# Patient Record
Sex: FEMALE | Race: WHITE | Employment: FULL TIME | URBAN - METROPOLITAN AREA
[De-identification: names, ages, dates, MRNs, and addresses within clinical notes are randomized per-mention and may not be internally consistent; named-entity substitution may affect disease eponyms.]

---

## 2017-02-01 ENCOUNTER — TELEPHONE (OUTPATIENT)
Dept: FAMILY MEDICINE | Facility: CLINIC | Age: 47
End: 2017-02-01

## 2017-02-01 NOTE — TELEPHONE ENCOUNTER
Panel Management Review      Patient has the following on her problem list: None      Composite cancer screening  Chart review shows that this patient is due/due soon for the following Pap Smear  Summary:    Patient is due/failing the following:   PAP and PHYSICAL    Action needed:   Patient needs office visit for physical and pap.    Type of outreach:    Phone, left message for patient to call back.     Questions for provider review:    None                                                                                                                                    Eve Barton CMA       Chart routed to self.

## 2017-02-01 NOTE — LETTER
[facility]  [address] [place]  [address]  Phone: [phone]      February 16, 2017      Josette Foster  [address]  [address]  [facility] [address]            Dear Ms. Foster,    As part of [facility]'s commitment to health and wellness we have recently reviewed your chart and your medical record indicates that you are due for one or more of the following:    -- Pap smear. The last pap that we have on file for you was from 11/13/2013. These are recommended every 3 years. Please call our clinic to schedule your pap smear / physical appointment.     Please try to schedule and/or complete the tests above within the next 2-4 weeks.   The number to call to schedule an appointment at [facility] [phone].    While we work hard to maintain accurate records on all our patients, it is always possible that this notice does not accurately reflect tests that you may have had. To ensure that we do not continue to send you notices please verify, at your next visit or by a C-Vibest message, that we have accurate dates of your tests, even if these were done many years ago.     Working together for your health is our goal.    Thank you for choosing [facility] for your healthcare needs.      Sincerely,     Velma RODRIGUEZ/  [facility] Care Team

## 2017-02-06 DIAGNOSIS — E03.9 HYPOTHYROIDISM, UNSPECIFIED TYPE: Primary | ICD-10-CM

## 2017-02-06 RX ORDER — LEVOTHYROXINE SODIUM 75 UG/1
75 TABLET ORAL DAILY
Qty: 90 TABLET | Refills: 1 | Status: SHIPPED | OUTPATIENT
Start: 2017-02-06 | End: 2017-03-22

## 2017-02-06 NOTE — TELEPHONE ENCOUNTER
levothyroxine (SYNTHROID, LEVOTHROID) 75 MCG tablet     Last Written Prescription Date: 7/13/16  Last Quantity: 90, # refills: 1  Last Office Visit with [facility], [facility] or [facility] prescribing provider: 9/29/16        TSH   Date Value Ref Range Status   07/12/2016 1.67 0.40 - 4.00 mU/L Final

## 2017-03-22 ENCOUNTER — OFFICE VISIT (OUTPATIENT)
Dept: FAMILY MEDICINE | Facility: CLINIC | Age: 47
End: 2017-03-22
Payer: COMMERCIAL

## 2017-03-22 VITALS
BODY MASS INDEX: 38.98 KG/M2 | HEIGHT: 68 IN | HEART RATE: 77 BPM | SYSTOLIC BLOOD PRESSURE: 127 MMHG | TEMPERATURE: 98.7 F | DIASTOLIC BLOOD PRESSURE: 81 MMHG | WEIGHT: 257.2 LBS

## 2017-03-22 DIAGNOSIS — J06.9 VIRAL URI: ICD-10-CM

## 2017-03-22 DIAGNOSIS — Z12.4 SCREENING FOR MALIGNANT NEOPLASM OF CERVIX: ICD-10-CM

## 2017-03-22 DIAGNOSIS — G43.109 MIGRAINE WITH AURA AND WITHOUT STATUS MIGRAINOSUS, NOT INTRACTABLE: ICD-10-CM

## 2017-03-22 DIAGNOSIS — Z12.31 VISIT FOR SCREENING MAMMOGRAM: ICD-10-CM

## 2017-03-22 DIAGNOSIS — E78.5 HYPERLIPIDEMIA LDL GOAL <160: ICD-10-CM

## 2017-03-22 DIAGNOSIS — E03.9 HYPOTHYROIDISM, UNSPECIFIED TYPE: ICD-10-CM

## 2017-03-22 DIAGNOSIS — K21.00 GASTROESOPHAGEAL REFLUX DISEASE WITH ESOPHAGITIS: ICD-10-CM

## 2017-03-22 DIAGNOSIS — Z00.01 ENCOUNTER FOR ROUTINE ADULT HEALTH EXAMINATION WITH ABNORMAL FINDINGS: Primary | ICD-10-CM

## 2017-03-22 LAB
ALBUMIN SERPL-MCNC: 3.6 G/DL (ref 3.4–5)
ALP SERPL-CCNC: 85 U/L (ref 40–150)
ALT SERPL W P-5'-P-CCNC: 16 U/L (ref 0–50)
ANION GAP SERPL CALCULATED.3IONS-SCNC: 6 MMOL/L (ref 3–14)
AST SERPL W P-5'-P-CCNC: 11 U/L (ref 0–45)
BASOPHILS # BLD AUTO: 0 10E9/L (ref 0–0.2)
BASOPHILS NFR BLD AUTO: 0.6 %
BILIRUB SERPL-MCNC: 0.3 MG/DL (ref 0.2–1.3)
BUN SERPL-MCNC: 17 MG/DL (ref 7–30)
CALCIUM SERPL-MCNC: 8.8 MG/DL (ref 8.5–10.1)
CHLORIDE SERPL-SCNC: 106 MMOL/L (ref 94–109)
CHOLEST SERPL-MCNC: 256 MG/DL
CO2 SERPL-SCNC: 28 MMOL/L (ref 20–32)
CREAT SERPL-MCNC: 0.82 MG/DL (ref 0.52–1.04)
DIFFERENTIAL METHOD BLD: ABNORMAL
EOSINOPHIL # BLD AUTO: 0.3 10E9/L (ref 0–0.7)
EOSINOPHIL NFR BLD AUTO: 4.9 %
ERYTHROCYTE [DISTWIDTH] IN BLOOD BY AUTOMATED COUNT: 14.9 % (ref 10–15)
GFR SERPL CREATININE-BSD FRML MDRD: 75 ML/MIN/1.7M2
GLUCOSE SERPL-MCNC: 90 MG/DL (ref 70–99)
HCT VFR BLD AUTO: 39.8 % (ref 35–47)
HDLC SERPL-MCNC: 47 MG/DL
HGB BLD-MCNC: 12.8 G/DL (ref 11.7–15.7)
LDLC SERPL CALC-MCNC: 180 MG/DL
LYMPHOCYTES # BLD AUTO: 2.5 10E9/L (ref 0.8–5.3)
LYMPHOCYTES NFR BLD AUTO: 36.6 %
MCH RBC QN AUTO: 27.5 PG (ref 26.5–33)
MCHC RBC AUTO-ENTMCNC: 32.2 G/DL (ref 31.5–36.5)
MCV RBC AUTO: 86 FL (ref 78–100)
MONOCYTES # BLD AUTO: 0.7 10E9/L (ref 0–1.3)
MONOCYTES NFR BLD AUTO: 9.7 %
NEUTROPHILS # BLD AUTO: 3.3 10E9/L (ref 1.6–8.3)
NEUTROPHILS NFR BLD AUTO: 48.2 %
NONHDLC SERPL-MCNC: 209 MG/DL
PLATELET # BLD AUTO: 536 10E9/L (ref 150–450)
POTASSIUM SERPL-SCNC: 4 MMOL/L (ref 3.4–5.3)
PROT SERPL-MCNC: 7.7 G/DL (ref 6.8–8.8)
RBC # BLD AUTO: 4.65 10E12/L (ref 3.8–5.2)
SODIUM SERPL-SCNC: 140 MMOL/L (ref 133–144)
TRIGL SERPL-MCNC: 147 MG/DL
TSH SERPL DL<=0.005 MIU/L-ACNC: 2.92 MU/L (ref 0.4–4)
WBC # BLD AUTO: 6.7 10E9/L (ref 4–11)

## 2017-03-22 PROCEDURE — G0145 SCR C/V CYTO,THINLAYER,RESCR: HCPCS | Performed by: PHYSICIAN ASSISTANT

## 2017-03-22 PROCEDURE — 87624 HPV HI-RISK TYP POOLED RSLT: CPT | Performed by: PHYSICIAN ASSISTANT

## 2017-03-22 PROCEDURE — 80061 LIPID PANEL: CPT | Performed by: PHYSICIAN ASSISTANT

## 2017-03-22 PROCEDURE — 80053 COMPREHEN METABOLIC PANEL: CPT | Performed by: PHYSICIAN ASSISTANT

## 2017-03-22 PROCEDURE — 84443 ASSAY THYROID STIM HORMONE: CPT | Performed by: PHYSICIAN ASSISTANT

## 2017-03-22 PROCEDURE — 99396 PREV VISIT EST AGE 40-64: CPT | Performed by: PHYSICIAN ASSISTANT

## 2017-03-22 PROCEDURE — 99213 OFFICE O/P EST LOW 20 MIN: CPT | Mod: 25 | Performed by: PHYSICIAN ASSISTANT

## 2017-03-22 PROCEDURE — 85025 COMPLETE CBC W/AUTO DIFF WBC: CPT | Performed by: PHYSICIAN ASSISTANT

## 2017-03-22 PROCEDURE — 36415 COLL VENOUS BLD VENIPUNCTURE: CPT | Performed by: PHYSICIAN ASSISTANT

## 2017-03-22 RX ORDER — PROPRANOLOL HYDROCHLORIDE 20 MG/1
20 TABLET ORAL 2 TIMES DAILY
Qty: 60 TABLET | Refills: 1 | Status: SHIPPED | OUTPATIENT
Start: 2017-03-22

## 2017-03-22 RX ORDER — LEVOTHYROXINE SODIUM 75 UG/1
75 TABLET ORAL DAILY
Qty: 90 TABLET | Refills: 1 | Status: SHIPPED | OUTPATIENT
Start: 2017-03-22 | End: 2017-08-14

## 2017-03-22 NOTE — PATIENT INSTRUCTIONS
MRI/MRA scan - For [place] imaging department: call [phone] to schedule  Follow up if symptoms worsen or not improving  If normal, consider triptan medication for migraine    Preventive Health Recommendations  Female Ages 40 to 49    Yearly exam:     See your health care provider every year in order to  1. Review health changes.   2. Discuss preventive care.    3. Review your medicines if your doctor prescribed any.      Get a Pap test every three years (unless you have an abnormal result and your provider advises testing more often).      If you get Pap tests with HPV test, you only need to test every 5 years, unless you have an abnormal result. You do not need a Pap test if your uterus was removed (hysterectomy) and you have not had cancer.      You should be tested each year for STDs (sexually transmitted diseases), if you're at risk.       Ask your doctor if you should have a mammogram.      Have a colonoscopy (test for colon cancer) if someone in your family has had colon cancer or polyps before age 50.       Have a cholesterol test every 5 years.       Have a diabetes test (fasting glucose) after age 45. If you are at risk for diabetes, you should have this test every 3 years.    Shots: Get a flu shot each year. Get a tetanus shot every 10 years.     Nutrition:     Eat at least 5 servings of fruits and vegetables each day.    Eat whole-grain bread, whole-wheat pasta and brown rice instead of white grains and rice.    Talk to your provider about Calcium and Vitamin D.     Lifestyle    Exercise at least 150 minutes a week (an average of 30 minutes a day, 5 days a week). This will help you control your weight and prevent disease.    Limit alcohol to one drink per day.    No smoking.     Wear sunscreen to prevent skin cancer.    See your dentist every six months for an exam and cleaning.

## 2017-03-22 NOTE — PROGRESS NOTES
"   SUBJECTIVE:     CC: Josette Foster is an 46 year old woman who presents for preventive health visit.     Healthy Habits:    Do you get at least three servings of calcium containing foods daily (dairy, green leafy vegetables, etc.)? yes    Amount of exercise or daily activities, outside of work: None recently she was going to school and working full time.  Will start again when weather gets nice    Problems taking medications regularly No    Medication side effects: No    Have you had an eye exam in the past two years? no    Do you see a dentist twice per year? yes    Do you have sleep apnea, excessive snoring or daytime drowsiness?no    Patient informed that anything we discuss that is not related to preventative medicine, may be billed for; patient verbalizes understanding.     *  Recheck Migraines, has been having them more frequently.  Migraines have increased since July 2016, states she has been having more ocular and auras with she has never had in the past. Takes aleve, propranolol which seems to help but would like to discuss going back on Relafen.    Not taking propranolol daily anymore  Auras are new for her - she had this during school this fall  Headache: Starts back of head, up to top of head, into eye area  Does hold tension in neck  Full blown migraines: has always had \"numbness\" fsensation left side of face, dizzy, sometimes nausea no vomiting.   What is new: Colorful flashing lights both sides, sometimes black spots. No other vision changes/blurred vision  Usually last a couple days  Lately has been getting them monthly. Prior to this summer every 3-4 months  She will take aleve or nabumetone, mountain dew, sleeps  Was on birth control in the past, didn't help headaches  Had MRI 2008  Has had migraines a long time. Tends to happen around periods. Lots of stress with school/job    Masters in mental health counseling - just finished last week.     Has a Cold right now  No fevers  No CP or SOB "   Sometimes hot in the shower, would like to recheck thyroid    Heartburn controlled if she watches what she eats. Takes unknown OTC at night  It is better than this fall   She does not want scope, cannot afford at this point with medical bills      Today's PHQ-2 Score:   PHQ-2 ( 1999 Pfizer) 3/22/2017 4/6/2015   Q1: Little interest or pleasure in doing things 0 0   Q2: Feeling down, depressed or hopeless 0 0   PHQ-2 Score 0 0       Abuse: Current or Past(Physical, Sexual or Emotional)- No  Do you feel safe in your environment - Yes    Social History   Substance Use Topics     Smoking status: Never Smoker     Smokeless tobacco: Never Used      Comment: no smokers in household     Alcohol use Yes      Comment: occ     The patient does not drink >3 drinks per day nor >7 drinks per week.    Recent Labs   Lab Test  04/16/15   1043  02/01/11   0910   CHOL  230*  204*   HDL  61  53   LDL  135*  132*   TRIG  169*  97   CHOLHDLRATIO  3.8  4.0       Reviewed orders with patient.  Reviewed health maintenance and updated orders accordingly - Yes    Mammo Decision Support:  Patient under age 50, mutual decision reflected in health maintenance.  Grandmother had breast cancer age 80    Pertinent mammograms are reviewed under the imaging tab.  History of abnormal Pap smear: NO - age 30- 65 PAP every 3 years recommended    Regular periods    Reviewed and updated as needed this visit by clinical staff  Tobacco  Allergies  Meds  Med Hx  Surg Hx  Fam Hx  Soc Hx        Reviewed and updated as needed this visit by Provider  Tobacco  Allergies  Meds  Med Hx  Surg Hx  Fam Hx  Soc Hx       Past Medical History:   Diagnosis Date     Migraine, unspecified, without mention of intractable migraine without mention of status migrainosus 3/18/2008    on Propranolol 20 mg bid     Mixed hyperlipidemia     On Tricor.     Unspecified hypothyroidism 8/12/2005    stable on 50 mcg of synthroid.      Past Surgical History:   Procedure  Laterality Date     C ORAL SURGERY PROCEDURE  1979    wisdom     C REMOVAL GALLBLADDER  2003     ESOPHAGOSCOPY, GASTROSCOPY, DUODENOSCOPY (EGD), COMBINED N/A 12/15/2015    Procedure: COMBINED ESOPHAGOSCOPY, GASTROSCOPY, DUODENOSCOPY (EGD), BIOPSY SINGLE OR MULTIPLE;  Surgeon: Yamilka Chapin MD;  Location: [facility]       ROS:  C: NEGATIVE for fever, chills, change in weight  I: NEGATIVE for worrisome rashes, moles or lesions  E: NEGATIVE for vision changes or irritation POSITIVE migraines with aura  ENT: NEGATIVE for ear, mouth and throat problems POSITIVE cold  R: NEGATIVE for significant cough or SOB  B: NEGATIVE for masses, tenderness or discharge  CV: NEGATIVE for chest pain, palpitations or peripheral edema  GI: NEGATIVE for nausea, abdominal pain, heartburn, or change in bowel habits  : NEGATIVE for unusual urinary or vaginal symptoms. Periods are regular.  M: NEGATIVE for significant arthralgias or myalgia  N: NEGATIVE for weakness, dizziness or paresthesias  P: NEGATIVE for changes in mood or affect    Problem list, Medication list, Allergies, and Medical/Social/Surgical histories reviewed in [facility] and updated as appropriate.  Labs reviewed in EPIC  BP Readings from Last 3 Encounters:   03/22/17 127/81   09/29/16 120/81   12/24/15 127/79    Wt Readings from Last 3 Encounters:   03/22/17 257 lb 3.2 oz (116.7 kg)   09/29/16 254 lb 14.4 oz (115.6 kg)   12/24/15 256 lb 14.4 oz (116.5 kg)                  Patient Active Problem List   Diagnosis     Obesity     Hypothyroidism     Migraine     Infertility female     HYPERLIPIDEMIA LDL GOAL <160     Esophagitis     Gastroesophageal reflux disease with esophagitis     Past Surgical History:   Procedure Laterality Date     C ORAL SURGERY PROCEDURE  1979    wisdom     C REMOVAL GALLBLADDER  2003     ESOPHAGOSCOPY, GASTROSCOPY, DUODENOSCOPY (EGD), COMBINED N/A 12/15/2015    Procedure: COMBINED ESOPHAGOSCOPY, GASTROSCOPY, DUODENOSCOPY (EGD), BIOPSY SINGLE OR  "MULTIPLE;  Surgeon: Yamilka Chapin MD;  Location: [facility]       Social History   Substance Use Topics     Smoking status: Never Smoker     Smokeless tobacco: Never Used      Comment: no smokers in household     Alcohol use Yes      Comment: occ     Family History   Problem Relation Age of Onset     HEART DISEASE Mother      murmur     GASTROINTESTINAL DISEASE Mother      gallbladder removal     DIABETES Mother      Migraines Mother      Respiratory Father      emphysema     Thyroid Disease Father      Peptic Ulcer Disease Father      CANCER Maternal Grandmother      breast age 80     HEART DISEASE Maternal Grandfather      hypertension     HEART DISEASE Brother      MI at 35, smoker     Depression Brother      HEART DISEASE Paternal Grandfather      MI     GASTROINTESTINAL DISEASE Sister      gallbladder removal     Depression Sister      Migraines Sister      Migraines Nephew      Mom Kadie         OBJECTIVE:     /81 (BP Location: Right arm, Patient Position: Chair, Cuff Size: Adult Regular)  Pulse 77  Temp 98.7  F (37.1  C) (Tympanic)  Ht 5' 8.11\" (1.73 m)  Wt 257 lb 3.2 oz (116.7 kg)  LMP 03/12/2017  BMI 38.98 kg/m2  EXAM:  GENERAL: healthy, alert and no distress  EYES: Eyes grossly normal to inspection, PERRL and conjunctivae and sclerae normal  HENT: ear canals and TM's normal, nose and mouth without ulcers or lesions  NECK: no adenopathy, no asymmetry, masses, or scars and thyroid normal to palpation  RESP: lungs clear to auscultation - no rales, rhonchi or wheezes  BREAST: normal without masses, tenderness or nipple discharge and no palpable axillary masses or adenopathy  CV: regular rate and rhythm, normal S1 S2, no S3 or S4, no murmur, click or rub, no peripheral edema and peripheral pulses strong  ABDOMEN: soft, nontender, no hepatosplenomegaly, no masses and bowel sounds normal   (female): normal female external genitalia, normal urethral meatus, vaginal mucosa, normal " cervix/adnexa/uterus without masses or discharge  MS: no gross musculoskeletal defects noted, no edema  SKIN: no suspicious lesions or rashes  BACK: no CVA tenderness, no paralumbar tenderness  PSYCH: mentation appears normal, affect normal/bright  LYMPH: no cervical, supraclavicular, axillary, or inguinal adenopathy  NEURO: Normal strength and tone, sensory exam grossly normal, mentation intact and speech normal. Reflexes equal and symmetric.  CN 2-12 grossly intact. Romberg negative. Heel/toe walk normal. Alternating movements, heel to shin normal, proprioception normal. PERRLA, EOMs intact.     ASSESSMENT/PLAN:         ICD-10-CM    1. Encounter for routine adult health examination with abnormal findings Z00.01    2. Migraine with aura and without status migrainosus, not intractable G43.109 propranolol (INDERAL) 20 MG tablet     Comprehensive metabolic panel     CBC with platelets differential     MR Brain w/o & w Contrast     MRA Angiogram Brain and Neck   3. Hypothyroidism, unspecified type E03.9 levothyroxine (SYNTHROID/LEVOTHROID) 75 MCG tablet     TSH with free T4 reflex   4. Gastroesophageal reflux disease with esophagitis K21.0    5. Visit for screening mammogram Z12.31 MA Screening Digital Bilateral     HPV High Risk Types DNA Cervical   6. Hyperlipidemia LDL goal <160 E78.5 Lipid Profile with reflex to direct LDL   7. Screening for malignant neoplasm of cervix Z12.4 Pap imaged thin layer screen with HPV - recommended age 30 - 65 years (select HPV order below)     Longtime history of migraines, however aura is new the last few months. Red flag signs to be seen urgently were discussed.    MRI/MRA scan - For [place] imaging department: call [phone] to schedule  Follow up if symptoms worsen or not improving  If normal, consider triptan medication for migraine    COUNSELING:   Reviewed preventive health counseling, as reflected in patient instructions     reports that she has never smoked. She has never  "used smokeless tobacco.    Estimated body mass index is 38.98 kg/(m^2) as calculated from the following:    Height as of this encounter: 5' 8.11\" (1.73 m).    Weight as of this encounter: 257 lb 3.2 oz (116.7 kg).   Weight management plan: Discussed healthy diet and exercise guidelines and patient will follow up in 12 months in clinic to re-evaluate.    Counseling Resources:  ATP IV Guidelines  Pooled Cohorts Equation Calculator  Breast Cancer Risk Calculator  FRAX Risk Assessment  ICSI Preventive Guidelines  Dietary Guidelines for Americans, 2010  USDA's MyPlate  ASA Prophylaxis  Lung CA Screening    Alexandra Arnold PA-C  [facility]  "

## 2017-03-22 NOTE — NURSING NOTE
"Chief Complaint   Patient presents with     Physical       Initial /81 (BP Location: Right arm, Patient Position: Chair, Cuff Size: Adult Regular)  Pulse 77  Temp 98.7  F (37.1  C) (Tympanic)  Ht 5' 8.11\" (1.73 m)  Wt 257 lb 3.2 oz (116.7 kg)  LMP 03/12/2017  BMI 38.98 kg/m2 Estimated body mass index is 38.98 kg/(m^2) as calculated from the following:    Height as of this encounter: 5' 8.11\" (1.73 m).    Weight as of this encounter: 257 lb 3.2 oz (116.7 kg).  Medication Reconciliation: lencho Kasper CMA  "

## 2017-03-22 NOTE — MR AVS SNAPSHOT
After Visit Summary   3/22/2017    Josette Foster    MRN: [id number]           Patient Information     Date Of Birth          1970        Visit Information        Provider Department      3/22/2017 10:00 AM Alexandra Arnold PA-C [facility]        Today's Diagnoses     Visit for screening mammogram    -  1    Hypothyroidism, unspecified type        Other type of migraine        Hyperlipidemia LDL goal <160        Migraine with aura and without status migrainosus, not intractable          Care Instructions    MRI/MRA scan - For [place] imaging department: call [phone] to schedule  Follow up if symptoms worsen or not improving  If normal, consider triptan medication for migraine    Preventive Health Recommendations  Female Ages 40 to 49    Yearly exam:     See your health care provider every year in order to  1. Review health changes.   2. Discuss preventive care.    3. Review your medicines if your doctor prescribed any.      Get a Pap test every three years (unless you have an abnormal result and your provider advises testing more often).      If you get Pap tests with HPV test, you only need to test every 5 years, unless you have an abnormal result. You do not need a Pap test if your uterus was removed (hysterectomy) and you have not had cancer.      You should be tested each year for STDs (sexually transmitted diseases), if you're at risk.       Ask your doctor if you should have a mammogram.      Have a colonoscopy (test for colon cancer) if someone in your family has had colon cancer or polyps before age 50.       Have a cholesterol test every 5 years.       Have a diabetes test (fasting glucose) after age 45. If you are at risk for diabetes, you should have this test every 3 years.    Shots: Get a flu shot each year. Get a tetanus shot every 10 years.     Nutrition:     Eat at least 5 servings of fruits and vegetables each day.    Eat whole-grain bread, whole-wheat pasta and  brown rice instead of white grains and rice.    Talk to your provider about Calcium and Vitamin D.     Lifestyle    Exercise at least 150 minutes a week (an average of 30 minutes a day, 5 days a week). This will help you control your weight and prevent disease.    Limit alcohol to one drink per day.    No smoking.     Wear sunscreen to prevent skin cancer.    See your dentist every six months for an exam and cleaning.        Follow-ups after your visit        Future tests that were ordered for you today     Open Future Orders        Priority Expected Expires Ordered    MRA Angiogram Brain and Neck Routine  3/22/2018 3/22/2017    MR Brain w/o & w Contrast Routine  3/22/2018 3/22/2017    MA Screening Digital Bilateral Routine  3/22/2018 3/22/2017            Who to contact     Normal or non-critical lab and imaging results will be communicated to you by Glassdoorhart, letter or phone within 4 business days after the clinic has received the results. If you do not hear from us within 7 days, please contact the clinic through Glassdoorhart or phone. If you have a critical or abnormal lab result, we will notify you by phone as soon as possible.  Submit refill requests through BetKlub or call your pharmacy and they will forward the refill request to us. Please allow 3 business days for your refill to be completed.          If you need to speak with a  for additional information , please call: [phone]             Additional Information About Your Visit        BetKlub Information     BetKlub gives you secure access to your electronic health record. If you see a primary care provider, you can also send messages to your care team and make appointments. If you have questions, please call your primary care clinic.  If you do not have a primary care provider, please call [phone] and they will assist you.        Care EveryWhere ID     This is your Care EveryWhere ID. This could be used by other organizations to  "access your [facility] medical records  [id number]        Your Vitals Were     Pulse Temperature Height Last Period BMI (Body Mass Index)       77 98.7  F (37.1  C) (Tympanic) 5' 8.11\" (1.73 m) 03/12/2017 38.98 kg/m2        Blood Pressure from Last 3 Encounters:   03/22/17 127/81   09/29/16 120/81   12/24/15 127/79    Weight from Last 3 Encounters:   03/22/17 257 lb 3.2 oz (116.7 kg)   09/29/16 254 lb 14.4 oz (115.6 kg)   12/24/15 256 lb 14.4 oz (116.5 kg)              We Performed the Following     CBC with platelets differential     Comprehensive metabolic panel     Lipid Profile with reflex to direct LDL     TSH with free T4 reflex          Today's Medication Changes          These changes are accurate as of: 3/22/17 11:20 AM.  If you have any questions, ask your nurse or doctor.               These medicines have changed or have updated prescriptions.        Dose/Directions    propranolol 20 MG tablet   Commonly known as:  INDERAL   This may have changed:    - medication strength  - how much to take  - when to take this   Used for:  Other type of migraine   Changed by:  Alexandra Arnold PA-C        Dose:  20 mg   Take 1 tablet (20 mg) by mouth 2 times daily   Quantity:  60 tablet   Refills:  1            Where to get your medicines      These medications were sent to [id number] - [place], [place] - [address] [place] [address] [facility] [address] [place] [address]    Hours:  24-hours Phone:  [phone]     levothyroxine 75 MCG tablet    propranolol 20 MG tablet                Primary Care Provider Office Phone #    [facility] [phone]       No address on file        Thank you!     Thank you for choosing [facility]  for your care. Our goal is always to provide you with excellent care. Hearing back from our patients is one way we can continue to improve our services. Please take a few minutes to complete the written survey that you may receive " in the mail after your visit with us. Thank you!             Your Updated Medication List - Protect others around you: Learn how to safely use, store and throw away your medicines at www.disposemymeds.org.          This list is accurate as of: 3/22/17 11:20 AM.  Always use your most recent med list.                   Brand Name Dispense Instructions for use    fluticasone 50 MCG/ACT spray    FLONASE    1 Bottle    Spray 1 spray into both nostrils daily       levothyroxine 75 MCG tablet    SYNTHROID/LEVOTHROID    90 tablet    Take 1 tablet (75 mcg) by mouth daily       propranolol 20 MG tablet    INDERAL    60 tablet    Take 1 tablet (20 mg) by mouth 2 times daily       ranitidine 150 MG tablet    ZANTAC    60 tablet    Take 1 tablet (150 mg) by mouth 2 times daily       tiZANidine 4 MG tablet    ZANAFLEX    30 tablet    Take 1-2 tablets (4-8 mg) by mouth 3 times daily as needed for muscle spasms

## 2017-03-22 NOTE — LETTER
My Migraine Action Plan      Date: 3/22/2017     My Name: Josette Foster   YOB: 1970  My Pharmacy:    [id number] - [place], [place] - [address] [place] AT [facility] OF [place] [facility]  [id number] - [place], [place] - [address] [facility] OF [facility] & [address]  [id number] - [place], [place] - [address] & [facility]       My (Preventative) Control Medicine: N/A        My Rescue Medicine: N/A   My Doctor: Jose Leahy     My Clinic: [facility]  [address] [address] [phone]        GREEN ZONE = Good Control    My headache plan is working.   I can do what I need to do.           I WILL:     ? Keep managing my triggers.  ? Write in my migraine diary each time I have a headache.  ? Keep taking my preventive (controller) medicine daily.  ? Take my relief and rescue medicine as needed.             YELLOW ZONE = Not Enough Control    My headache plan isn t always working.   My headaches keep me from doing   some of the things I need to do.       I WILL:     ? Set goals to control my triggers and act on them.  ? Write in my migraine diary each time I have a headache and review it for                      patterns or new triggers.  ? Keep taking my preventive (controller) medicine daily.  ? Take my relief and rescue medicine as needed.  ? Call my doctor or clinic at if I stay in the Yellow Zone.             RED ZONE = Poor or No Control    My headache plan has  failed. I can t do anything  when I have one. My  medicines aren t working.           I WILL:   ? Set goals to control my triggers and act on them.  ? Write in my migraine diary each time I have a headache and review it for                      patterns or new triggers.  ? Keep taking my preventive (controller) medicine daily.  ? Take my relief and rescue medicine as needed.  ? Call my doctor or clinic or go to urgent care or an ER  if I m having the worst                  headache of my life.  ? Call my doctor or clinic or go to urgent care or an ER if my medicine doesn t work.  ? Let my doctor or clinic know within 2 weeks if I have gone to an urgent care or             emergency department.          Provider specific instructions:  None

## 2017-03-23 ENCOUNTER — TELEPHONE (OUTPATIENT)
Dept: FAMILY MEDICINE | Facility: CLINIC | Age: 47
End: 2017-03-23

## 2017-03-23 DIAGNOSIS — E78.5 HYPERLIPIDEMIA LDL GOAL <130: ICD-10-CM

## 2017-03-23 DIAGNOSIS — D75.839 THROMBOCYTOSIS: ICD-10-CM

## 2017-03-23 DIAGNOSIS — G43.109 MIGRAINE WITH AURA AND WITHOUT STATUS MIGRAINOSUS, NOT INTRACTABLE: Primary | ICD-10-CM

## 2017-03-23 RX ORDER — ATORVASTATIN CALCIUM 20 MG/1
20 TABLET, FILM COATED ORAL DAILY
Qty: 30 TABLET | Refills: 1 | Status: SHIPPED | OUTPATIENT
Start: 2017-03-23 | End: 2017-08-03

## 2017-03-23 NOTE — TELEPHONE ENCOUNTER
"Orders placed.    Also please tell patient lab results: thyroid and basic panel (electrolytes, glucose, kidney function) looked okay.   Platelet count is high. Looks like it has been high at the last checks as well (but those were 9 and 13 years ago). I recommend repeating CBC and getting blood morphology to make sure the blood cells themselves look okay.    Your cholesterol looks much higher than last year.  Your LDL (bad cholesterol) is 180 (normal is less than 130).  Your HDL (good cholesterol) is 47 (normal for women is 50 or greater, normal for men is 40 or greater).  Your triglycerides (fats in the blood) are 147 (normal is less than 150).     Some ways to lower your bad cholesterol and raise your good cholesterol include eating a diet that is lower in animal fats and higher in fruits, vegetables, and fiber, and getting 30 minutes of aerobic exercise most days of the week.    LDL is the \"bad\" cholesterol linked to heart disease and stroke.  the LDL should be less than 130. You can lower this by following a low fat and low cholesterol diet.    I recommend starting a cholesterol medication called a statin, lipitor- this helps reduce cardiovascular and coronary artery disease risk.  One possible side effect is muscle aches - let me know if you have these issues. I did send in this medication - however if you have questions or concerns with this, please let me know and we can discuss that.  SHUKRI Duncan PA-C   "

## 2017-03-23 NOTE — TELEPHONE ENCOUNTER
Reason for Call:  Other call back    Detailed comments: Kat from Imaging called saying that the order for Josette's MRA of her head and neck need to be separate orders before they can schedule her.  Please advise    Phone Number Patient can be reached at:   [phone]    Best Time: any    Can we leave a detailed message on this number? YES    Call taken on 3/23/2017 at 9:35 AM by Margret Suggs

## 2017-03-24 LAB
COPATH REPORT: NORMAL
PAP: NORMAL

## 2017-03-27 LAB
FINAL DIAGNOSIS: NORMAL
HPV HR 12 DNA CVX QL NAA+PROBE: NEGATIVE
HPV16 DNA SPEC QL NAA+PROBE: NEGATIVE
HPV18 DNA SPEC QL NAA+PROBE: NEGATIVE
SPECIMEN DESCRIPTION: NORMAL

## 2017-04-05 ENCOUNTER — HOSPITAL ENCOUNTER (OUTPATIENT)
Dept: MRI IMAGING | Facility: CLINIC | Age: 47
End: 2017-04-05
Attending: PHYSICIAN ASSISTANT
Payer: COMMERCIAL

## 2017-04-05 ENCOUNTER — HOSPITAL ENCOUNTER (OUTPATIENT)
Dept: MRI IMAGING | Facility: CLINIC | Age: 47
Discharge: HOME | End: 2017-04-05
Attending: PHYSICIAN ASSISTANT | Admitting: PHYSICIAN ASSISTANT
Payer: COMMERCIAL

## 2017-04-05 DIAGNOSIS — G43.109 MIGRAINE WITH AURA AND WITHOUT STATUS MIGRAINOSUS, NOT INTRACTABLE: ICD-10-CM

## 2017-04-05 PROCEDURE — 70544 MR ANGIOGRAPHY HEAD W/O DYE: CPT | Mod: XS

## 2017-04-05 PROCEDURE — 70549 MR ANGIOGRAPH NECK W/O&W/DYE: CPT

## 2017-04-05 PROCEDURE — 70553 MRI BRAIN STEM W/O & W/DYE: CPT

## 2017-04-05 PROCEDURE — 25500064 ZZH RX 255 OP 636: Performed by: PHYSICIAN ASSISTANT

## 2017-04-05 PROCEDURE — A9585 GADOBUTROL INJECTION: HCPCS | Performed by: PHYSICIAN ASSISTANT

## 2017-04-05 PROCEDURE — 27210995 ZZH RX 272: Performed by: PHYSICIAN ASSISTANT

## 2017-04-05 RX ORDER — ACYCLOVIR 200 MG/1
60 CAPSULE ORAL ONCE
Status: COMPLETED | OUTPATIENT
Start: 2017-04-05 | End: 2017-04-05

## 2017-04-05 RX ORDER — GADOBUTROL 604.72 MG/ML
10 INJECTION INTRAVENOUS ONCE
Status: COMPLETED | OUTPATIENT
Start: 2017-04-05 | End: 2017-04-05

## 2017-04-05 RX ADMIN — GADOBUTROL 10 ML: 604.72 INJECTION INTRAVENOUS at 06:58

## 2017-04-05 RX ADMIN — SODIUM CHLORIDE 60 ML: 9 INJECTION, SOLUTION INTRAMUSCULAR; INTRAVENOUS; SUBCUTANEOUS at 06:58

## 2017-04-19 ENCOUNTER — HOSPITAL ENCOUNTER (OUTPATIENT)
Dept: MAMMOGRAPHY | Facility: CLINIC | Age: 47
Discharge: HOME | End: 2017-04-19
Attending: PHYSICIAN ASSISTANT | Admitting: PHYSICIAN ASSISTANT
Payer: COMMERCIAL

## 2017-04-19 DIAGNOSIS — Z12.31 VISIT FOR SCREENING MAMMOGRAM: ICD-10-CM

## 2017-04-19 PROCEDURE — 77063 BREAST TOMOSYNTHESIS BI: CPT

## 2017-04-19 PROCEDURE — G0202 SCR MAMMO BI INCL CAD: HCPCS

## 2017-08-03 ENCOUNTER — OFFICE VISIT (OUTPATIENT)
Dept: FAMILY MEDICINE | Facility: CLINIC | Age: 47
End: 2017-08-03
Payer: COMMERCIAL

## 2017-08-03 VITALS
HEIGHT: 68 IN | SYSTOLIC BLOOD PRESSURE: 127 MMHG | BODY MASS INDEX: 40.16 KG/M2 | TEMPERATURE: 99.2 F | DIASTOLIC BLOOD PRESSURE: 75 MMHG | HEART RATE: 82 BPM | OXYGEN SATURATION: 98 % | WEIGHT: 265 LBS

## 2017-08-03 DIAGNOSIS — M25.562 CHRONIC PAIN OF LEFT KNEE: ICD-10-CM

## 2017-08-03 DIAGNOSIS — R60.0 EDEMA OF BOTH LEGS: Primary | ICD-10-CM

## 2017-08-03 DIAGNOSIS — G89.29 CHRONIC PAIN OF LEFT KNEE: ICD-10-CM

## 2017-08-03 DIAGNOSIS — H11.31 SUBCONJUNCTIVAL HEMATOMA, RIGHT: ICD-10-CM

## 2017-08-03 DIAGNOSIS — D75.839 THROMBOCYTOSIS: ICD-10-CM

## 2017-08-03 DIAGNOSIS — J34.89 SINUS PRESSURE: ICD-10-CM

## 2017-08-03 LAB
ALBUMIN SERPL-MCNC: 3.7 G/DL (ref 3.4–5)
ALP SERPL-CCNC: 85 U/L (ref 40–150)
ALT SERPL W P-5'-P-CCNC: 18 U/L (ref 0–50)
ANION GAP SERPL CALCULATED.3IONS-SCNC: 8 MMOL/L (ref 3–14)
AST SERPL W P-5'-P-CCNC: 16 U/L (ref 0–45)
BASOPHILS # BLD AUTO: 0 10E9/L (ref 0–0.2)
BASOPHILS NFR BLD AUTO: 0.5 %
BILIRUB SERPL-MCNC: 0.6 MG/DL (ref 0.2–1.3)
BUN SERPL-MCNC: 17 MG/DL (ref 7–30)
CALCIUM SERPL-MCNC: 8.9 MG/DL (ref 8.5–10.1)
CHLORIDE SERPL-SCNC: 106 MMOL/L (ref 94–109)
CO2 SERPL-SCNC: 23 MMOL/L (ref 20–32)
CREAT SERPL-MCNC: 0.78 MG/DL (ref 0.52–1.04)
DIFFERENTIAL METHOD BLD: ABNORMAL
EOSINOPHIL # BLD AUTO: 0.4 10E9/L (ref 0–0.7)
EOSINOPHIL NFR BLD AUTO: 5.7 %
ERYTHROCYTE [DISTWIDTH] IN BLOOD BY AUTOMATED COUNT: 14.8 % (ref 10–15)
GFR SERPL CREATININE-BSD FRML MDRD: 80 ML/MIN/1.7M2
GLUCOSE SERPL-MCNC: 87 MG/DL (ref 70–99)
HCT VFR BLD AUTO: 37.6 % (ref 35–47)
HGB BLD-MCNC: 12.2 G/DL (ref 11.7–15.7)
LYMPHOCYTES # BLD AUTO: 2.7 10E9/L (ref 0.8–5.3)
LYMPHOCYTES NFR BLD AUTO: 35.4 %
MCH RBC QN AUTO: 27.4 PG (ref 26.5–33)
MCHC RBC AUTO-ENTMCNC: 32.4 G/DL (ref 31.5–36.5)
MCV RBC AUTO: 85 FL (ref 78–100)
MONOCYTES # BLD AUTO: 0.7 10E9/L (ref 0–1.3)
MONOCYTES NFR BLD AUTO: 9.2 %
NEUTROPHILS # BLD AUTO: 3.8 10E9/L (ref 1.6–8.3)
NEUTROPHILS NFR BLD AUTO: 49.2 %
PLATELET # BLD AUTO: 469 10E9/L (ref 150–450)
POTASSIUM SERPL-SCNC: 3.8 MMOL/L (ref 3.4–5.3)
PROT SERPL-MCNC: 7.5 G/DL (ref 6.8–8.8)
RBC # BLD AUTO: 4.45 10E12/L (ref 3.8–5.2)
RETICS # AUTO: 74.3 10E9/L (ref 25–95)
RETICS/RBC NFR AUTO: 1.6 % (ref 0.5–2)
SODIUM SERPL-SCNC: 137 MMOL/L (ref 133–144)
WBC # BLD AUTO: 7.7 10E9/L (ref 4–11)

## 2017-08-03 PROCEDURE — 80053 COMPREHEN METABOLIC PANEL: CPT | Performed by: PHYSICIAN ASSISTANT

## 2017-08-03 PROCEDURE — 85025 COMPLETE CBC W/AUTO DIFF WBC: CPT | Performed by: PHYSICIAN ASSISTANT

## 2017-08-03 PROCEDURE — 36415 COLL VENOUS BLD VENIPUNCTURE: CPT | Performed by: PHYSICIAN ASSISTANT

## 2017-08-03 PROCEDURE — 85060 BLOOD SMEAR INTERPRETATION: CPT | Performed by: PHYSICIAN ASSISTANT

## 2017-08-03 PROCEDURE — 99214 OFFICE O/P EST MOD 30 MIN: CPT | Performed by: PHYSICIAN ASSISTANT

## 2017-08-03 PROCEDURE — 85045 AUTOMATED RETICULOCYTE COUNT: CPT | Performed by: PHYSICIAN ASSISTANT

## 2017-08-03 RX ORDER — HYDROCHLOROTHIAZIDE 12.5 MG/1
12.5 TABLET ORAL DAILY
Qty: 30 TABLET | Refills: 1 | Status: SHIPPED | OUTPATIENT
Start: 2017-08-03 | End: 2017-12-24

## 2017-08-03 NOTE — PROGRESS NOTES
"SUBJECTIVE:                                                    Josette Foster is a 46 year old female who presents to clinic today for the following health issues:    *  Swelling in both ankles started 3 weeks ago  * hemorrhage right eye - bloodshot since Saturday. No pain.      Patient denies: CP, SOB, abdominal pain, N/V, HA, dizziness/lightheadedness, diaphoresis, visual changes, heart palpitations      Was on HCTZ 2003 for a short while for swelling, remembers it working    Has had nabumetone for \"tears\" of meniscus each knee.'MRI 2010  Knees have been worse the last few weeks  Told surgery was an option    Kidney function okay in March  Due for lab recheck: high platelets  Cholesterol was quite high: was on lipitor 2 weeks, had diarrhea/cramping which improved  Is also taking probiotic    Problem list and histories reviewed & adjusted, as indicated.  Additional history: none    Patient Active Problem List   Diagnosis     Obesity     Hypothyroidism     Migraine     Infertility female     HYPERLIPIDEMIA LDL GOAL <160     Esophagitis     Gastroesophageal reflux disease with esophagitis     Past Surgical History:   Procedure Laterality Date     C ORAL SURGERY PROCEDURE  1979    molinadom     ESOPHAGOSCOPY, GASTROSCOPY, DUODENOSCOPY (EGD), COMBINED N/A 12/15/2015    Procedure: COMBINED ESOPHAGOSCOPY, GASTROSCOPY, DUODENOSCOPY (EGD), BIOPSY SINGLE OR MULTIPLE;  Surgeon: Yamilka Chapin MD;  Location: [facility]     HC REMOVAL GALLBLADDER  2003       Social History   Substance Use Topics     Smoking status: Never Smoker     Smokeless tobacco: Never Used      Comment: no smokers in household     Alcohol use Yes      Comment: occ     Family History   Problem Relation Age of Onset     HEART DISEASE Mother      murmur     GASTROINTESTINAL DISEASE Mother      gallbladder removal     DIABETES Mother      Migraines Mother      Respiratory Father      emphysema     Thyroid Disease Father      Peptic Ulcer Disease Father  " "    CANCER Maternal Grandmother      breast age 80     HEART DISEASE Maternal Grandfather      hypertension     HEART DISEASE Brother      MI at 35, smoker     Depression Brother      HEART DISEASE Paternal Grandfather      MI     GASTROINTESTINAL DISEASE Sister      gallbladder removal     Depression Sister      Migraines Sister      Migraines Nephew      Mom Kadie             ROS:Other than noted above, general, HEENT, respiratory, cardiac, MS, and gastrointestinal systems are negative.     OBJECTIVE:                                                    /75 (BP Location: Right arm, Patient Position: Sitting, Cuff Size: Adult Regular)  Pulse 82  Temp 99.2  F (37.3  C) (Tympanic)  Ht 5' 8.1\" (1.73 m)  Wt 265 lb (120.2 kg)  SpO2 98%  BMI 40.17 kg/m2 Body mass index is 40.17 kg/(m^2).   GENERAL: healthy, alert, well nourished, well hydrated, no distress  EYES: Eyes grossly normal to inspection, extraocular movements - intact, and PERRL POSITIVE right eye inner eye Subconjunctival hemorrhage  HENT: ear canals- normal; TMs- normal; Nose- normal; Mouth- no ulcers, no lesions  NECK: no tenderness, no adenopathy, no asymmetry, no masses, no stiffness; thyroid- normal to palpation  RESP: lungs clear to auscultation - no rales, no rhonchi, no wheezes  CV: regular rates and rhythm, normal S1 S2, no S3 or S4 and no murmur, no click or rub -  ABDOMEN: soft, no tenderness, no  hepatosplenomegaly, no masses, normal bowel sounds  MS: extremities- no gross deformities noted, POSITIVE 1+ bilateral edema lower extremities, no calf tenderness  Knee Exam: Inspection: No effusion  Tender: particularly left knee tender to palpation of joint spaces bilaterally   Non-tender: no other tenderness  Active Range of Motion: full flexion, full extension  Strength: full  Special tests: normal Valgus stress test, normal Varus, negative Lachman's test, negative Yoni's, no apprehension with lateral stress of the patella, normal " anterior and posterior drawer, normal medial and lateral ligaments        ASSESSMENT/PLAN:                                                      ASSESSMENT/PLAN:      ICD-10-CM    1. Edema of both legs R60.0 Comprehensive metabolic panel     hydrochlorothiazide 12.5 MG TABS tablet   2. Thrombocytosis (H) D47.3 CBC with platelets differential     Reticulocyte Count     Blood Morphology Pathologist Review   3. Chronic pain of left knee M25.562 ORTHO  REFERRAL    G89.29    4. Sinus pressure J34.89    5. Subconjunctival hematoma, right H11.31        Patient Instructions   Restart lipitor 1/2 tablet = 10 mg every other day. Taper up as tolerated. Let me know if issues    For swelling: monitor this. Follow up if worsening or new/changing symptoms  Use compression stockings  Elevate  Lower salt diet  Can use HCTZ (water pill) - if you start this, follow up in a few weeks for lab recheck and blood pressure check.  - can take this short term, let me know if it's working    For sinuses: let me know if worsening. Consider seeing ENT or getting CT scan of the sinuses  Continue flonase.     For eye: follow up with eye doctor if not improving in the next few weeks  Handout on subconjunctival hemorrhage    30 minutes was spent face to face with the patient today discussing history, diagnosis, and follow-up plan as noted above. Over 50% of the visit was spent in counseling and coordination of care. Total Visit Time: 30 minutes.   Alexandra Arnold PA-C   [facility]

## 2017-08-03 NOTE — NURSING NOTE
"Chief Complaint   Patient presents with     Leg Swelling     Eye Problem       Initial /75 (BP Location: Right arm, Patient Position: Sitting, Cuff Size: Adult Regular)  Pulse 82  Temp 99.2  F (37.3  C) (Tympanic)  Ht 5' 8.1\" (1.73 m)  Wt 265 lb (120.2 kg)  SpO2 98%  BMI 40.17 kg/m2 Estimated body mass index is 40.17 kg/(m^2) as calculated from the following:    Height as of this encounter: 5' 8.1\" (1.73 m).    Weight as of this encounter: 265 lb (120.2 kg).  Medication Reconciliation: complete   Marybeth Kasper CMA  "

## 2017-08-03 NOTE — PATIENT INSTRUCTIONS
Restart lipitor 1/2 tablet = 10 mg every other day. Taper up as tolerated. Let me know if issues    For swelling: monitor this. Follow up if worsening or new/changing symptoms  Use compression stockings  Elevate  Lower salt diet  Can use HCTZ (water pill) - if you start this, follow up in a few weeks for lab recheck and blood pressure check.  - can take this short term, let me know if it's working    For sinuses: let me know if worsening. Consider seeing ENT or getting CT scan of the sinuses  Continue flonase.     For eye: follow up with eye doctor if not improving in the next few weeks    Subconjunctival Hemorrhage    A subconjunctival hemorrhage is a result of a broken blood vessel in the white portion of the eye. It is usually painless and may be caused by coughing, sneezing, or vomiting. An injury to the eye can cause this. It can also be a sign of hypertension (high blood pressure) or a bleeding disorder.  Although it can look frightening, the presence of the blood is not serious. The blood will be reabsorbed without treatment within 2 to 3 weeks.  Home care  You may continue your usual activities.  Follow-up care  Follow up with your healthcare provider, or as advised.  When to seek medical advice  Contact your healthcare provider right away if any of these occur:    Pain in the eye    Change in vision    The blood does not disappear within three weeks    Increasing redness or swelling of the eye    Severe headache or dizziness    Signs of bruising or bleeding from other parts of your body  Date Last Reviewed: 6/14/2015 2000-2017 [employer]. [address] [place] [address], [place] [address]. All rights reserved. This information is not intended as a substitute for professional medical care. Always follow your healthcare professional's instructions.

## 2017-08-03 NOTE — MR AVS SNAPSHOT
After Visit Summary   8/3/2017    Josette Foster    MRN: [id number]           Patient Information     Date Of Birth          1970        Visit Information        Provider Department      8/3/2017 9:00 AM Alexandra Arnold PA-C [facility]        Today's Diagnoses     Edema of both legs    -  1    Thrombocytosis (H)        Chronic pain of left knee          Care Instructions    Restart lipitor 1/2 tablet = 10 mg every other day. Taper up as tolerated. Let me know if issues    For swelling: monitor this. Follow up if worsening or new/changing symptoms  Use compression stockings  Elevate  Lower salt diet  Can use HCTZ (water pill) - if you start this, follow up in a few weeks for lab recheck and blood pressure check.  - can take this short term, let me know if it's working    For sinuses: let me know if worsening. Consider seeing ENT or getting CT scan of the sinuses  Continue flonase.     For eye: follow up with eye doctor if not improving in the next few weeks    Subconjunctival Hemorrhage    A subconjunctival hemorrhage is a result of a broken blood vessel in the white portion of the eye. It is usually painless and may be caused by coughing, sneezing, or vomiting. An injury to the eye can cause this. It can also be a sign of hypertension (high blood pressure) or a bleeding disorder.  Although it can look frightening, the presence of the blood is not serious. The blood will be reabsorbed without treatment within 2 to 3 weeks.  Home care  You may continue your usual activities.  Follow-up care  Follow up with your healthcare provider, or as advised.  When to seek medical advice  Contact your healthcare provider right away if any of these occur:    Pain in the eye    Change in vision    The blood does not disappear within three weeks    Increasing redness or swelling of the eye    Severe headache or dizziness    Signs of bruising or bleeding from other parts of your body  Date Last  Reviewed: 6/14/2015 2000-2017 [employer]. [address] [place] [address], [place] [address]. All rights reserved. This information is not intended as a substitute for professional medical care. Always follow your healthcare professional's instructions.                Follow-ups after your visit        Additional Services     ORTHO  REFERRAL       [facility] is referring you to the Orthopedic  Services at [facility].       The  Representative will assist you in the coordination of your Orthopedic and Musculoskeletal Care as prescribed by your physician.    The  Representative will call you within 1 business day to help schedule your appointment, or you may contact the  Representative at:    All areas ~ [phone]     Type of Referral : Non Surgical       Timeframe requested: Routine    Coverage of these services is subject to the terms and limitations of your health insurance plan.  Please call member services at your health plan with any benefit or coverage questions.      If X-rays, CT or MRI's have been performed, please contact the facility where they were done to arrange for , prior to your scheduled appointment.  Please bring this referral request to your appointment and present it to your specialist.                  Who to contact     Normal or non-critical lab and imaging results will be communicated to you by MyChart, letter or phone within 4 business days after the clinic has received the results. If you do not hear from us within 7 days, please contact the clinic through MyChart or phone. If you have a critical or abnormal lab result, we will notify you by phone as soon as possible.  Submit refill requests through [employer] or call your pharmacy and they will forward the refill request to us. Please allow 3 business days for your refill to be completed.          If you need to speak with a Medical  " for additional information , please call: [phone]             Additional Information About Your Visit        MyChart Information     meebee gives you secure access to your electronic health record. If you see a primary care provider, you can also send messages to your care team and make appointments. If you have questions, please call your primary care clinic.  If you do not have a primary care provider, please call [phone] and they will assist you.        Care EveryWhere ID     This is your Care EveryWhere ID. This could be used by other organizations to access your [place] medical records  [id number]        Your Vitals Were     Pulse Temperature Height Pulse Oximetry BMI (Body Mass Index)       82 99.2  F (37.3  C) (Tympanic) 5' 8.1\" (1.73 m) 98% 40.17 kg/m2        Blood Pressure from Last 3 Encounters:   08/03/17 127/75   03/22/17 127/81   09/29/16 120/81    Weight from Last 3 Encounters:   08/03/17 265 lb (120.2 kg)   03/22/17 257 lb 3.2 oz (116.7 kg)   09/29/16 254 lb 14.4 oz (115.6 kg)              We Performed the Following     Blood Morphology Pathologist Review     CBC with platelets differential     Comprehensive metabolic panel     ORTHO  REFERRAL     Reticulocyte Count          Today's Medication Changes          These changes are accurate as of: 8/3/17 10:18 AM.  If you have any questions, ask your nurse or doctor.               Start taking these medicines.        Dose/Directions    hydrochlorothiazide 12.5 MG Tabs tablet   Used for:  Edema of both legs   Started by:  Alexandra Arnold PA-C        Dose:  12.5 mg   Take 1 tablet (12.5 mg) by mouth daily   Quantity:  30 tablet   Refills:  1            Where to get your medicines      These medications were sent to [facility] - [address]  [address] [address]     Phone:  [phone]     hydrochlorothiazide 12.5 MG Tabs tablet                Primary Care Provider " Office Phone #    [facility] [phone]       No address on file        Equal Access to Services     RICCO VELASCO : Hadii aad ku hadchristinemaylin Barry, waaxda luqadaha, qaybta kaalmada frank, caio maryin hayaabozena ritchieamelia perez anatoliy rojas. So [facility] [phone].    ATENCIÓN: Si habla español, tiene a barnett disposición servicios gratuitos de asistencia lingüística. Llame al [phone].    We comply with applicable federal civil rights laws and [place] laws. We do not discriminate on the basis of race, color, national origin, age, disability sex, sexual orientation or gender identity.            Thank you!     Thank you for choosing [facility]  for your care. Our goal is always to provide you with excellent care. Hearing back from our patients is one way we can continue to improve our services. Please take a few minutes to complete the written survey that you may receive in the mail after your visit with us. Thank you!             Your Updated Medication List - Protect others around you: Learn how to safely use, store and throw away your medicines at www.disposemymeds.org.          This list is accurate as of: 8/3/17 10:18 AM.  Always use your most recent med list.                   Brand Name Dispense Instructions for use Diagnosis    fluticasone 50 MCG/ACT spray    FLONASE    1 Bottle    Spray 1 spray into both nostrils daily    Post-nasal drip       hydrochlorothiazide 12.5 MG Tabs tablet     30 tablet    Take 1 tablet (12.5 mg) by mouth daily    Edema of both legs       levothyroxine 75 MCG tablet    SYNTHROID/LEVOTHROID    90 tablet    Take 1 tablet (75 mcg) by mouth daily    Hypothyroidism, unspecified type       propranolol 20 MG tablet    INDERAL    60 tablet    Take 1 tablet (20 mg) by mouth 2 times daily    Migraine with aura and without status migrainosus, not intractable       ranitidine 150 MG tablet    ZANTAC    60 tablet    Take 1 tablet (150 mg) by mouth 2 times daily     Gastroesophageal reflux disease, esophagitis presence not specified       tiZANidine 4 MG tablet    ZANAFLEX    30 tablet    Take 1-2 tablets (4-8 mg) by mouth 3 times daily as needed for muscle spasms    Muscle spasms of neck

## 2017-08-04 LAB — COPATH REPORT: NORMAL

## 2017-08-05 DIAGNOSIS — E03.9 HYPOTHYROIDISM, UNSPECIFIED TYPE: ICD-10-CM

## 2017-08-07 NOTE — TELEPHONE ENCOUNTER
LEVOTHYROXINE 0.075MG (75MCG) TABS       Last Written Prescription Date: 3/22/17  Last Quantity: 90, # refills: 1  Last Office Visit with G, P or [facility] prescribing provider: 8/3/17        TSH   Date Value Ref Range Status   03/22/2017 2.92 0.40 - 4.00 mU/L Final

## 2017-08-08 RX ORDER — LEVOTHYROXINE SODIUM 75 UG/1
TABLET ORAL
Qty: 90 TABLET | Refills: 2 | Status: SHIPPED | OUTPATIENT
Start: 2017-08-08 | End: 2018-05-22

## 2017-08-08 NOTE — TELEPHONE ENCOUNTER
Prescription approved per [facility] [facility].  Aniceto Miller RN

## 2017-08-14 ENCOUNTER — OFFICE VISIT (OUTPATIENT)
Dept: FAMILY MEDICINE | Facility: CLINIC | Age: 47
End: 2017-08-14
Payer: COMMERCIAL

## 2017-08-14 VITALS
OXYGEN SATURATION: 97 % | BODY MASS INDEX: 40.14 KG/M2 | DIASTOLIC BLOOD PRESSURE: 82 MMHG | HEART RATE: 81 BPM | SYSTOLIC BLOOD PRESSURE: 133 MMHG | WEIGHT: 264.8 LBS | TEMPERATURE: 98.8 F

## 2017-08-14 DIAGNOSIS — R07.0 THROAT PAIN: Primary | ICD-10-CM

## 2017-08-14 LAB
DEPRECATED S PYO AG THROAT QL EIA: NORMAL
MICRO REPORT STATUS: NORMAL
SPECIMEN SOURCE: NORMAL

## 2017-08-14 PROCEDURE — 87077 CULTURE AEROBIC IDENTIFY: CPT | Performed by: PHYSICIAN ASSISTANT

## 2017-08-14 PROCEDURE — 99213 OFFICE O/P EST LOW 20 MIN: CPT | Performed by: PHYSICIAN ASSISTANT

## 2017-08-14 PROCEDURE — 87070 CULTURE OTHR SPECIMN AEROBIC: CPT | Performed by: PHYSICIAN ASSISTANT

## 2017-08-14 PROCEDURE — 87880 STREP A ASSAY W/OPTIC: CPT | Performed by: PHYSICIAN ASSISTANT

## 2017-08-14 RX ORDER — ATORVASTATIN CALCIUM 20 MG/1
TABLET, FILM COATED ORAL
Refills: 1 | COMMUNITY
Start: 2017-03-23

## 2017-08-14 NOTE — PATIENT INSTRUCTIONS
Have plenty of rest and fluids  Use nasal spray Afrin OTC for 3-4 days in each nostril for congestion  Saltwater gargles, throat lozenges  We'll let you know what throat culture shows  Monitor the lymph node as well  Follow up if worsening symptoms or if not improving

## 2017-08-14 NOTE — NURSING NOTE
"Chief Complaint   Patient presents with     Pharyngitis       Initial /82 (BP Location: Right arm, Patient Position: Sitting, Cuff Size: Adult Large)  Pulse 81  Temp 98.8  F (37.1  C) (Tympanic)  Wt 264 lb 12.8 oz (120.1 kg)  SpO2 97%  BMI 40.14 kg/m2 Estimated body mass index is 40.14 kg/(m^2) as calculated from the following:    Height as of 8/3/17: 5' 8.1\" (1.73 m).    Weight as of this encounter: 264 lb 12.8 oz (120.1 kg).  Medication Reconciliation: complete   Marybeth Kasper CMA  "

## 2017-08-14 NOTE — PROGRESS NOTES
SUBJECTIVE:                                                    Josette Foster is a 46 year old female who presents to clinic today for the following health issues:    ENT Symptoms             Symptoms: cc Present Absent Comment   Fever/Chills   x    Fatigue  x     Muscle Aches   x    Eye Irritation   x    Sneezing   x    Nasal Calin/Drg x x  Congestion but feels dry   Sinus Pressure/Pain  x     Loss of smell   x    Dental pain   x    Sore Throat  x     Swollen Glands  x     Ear Pain/Fullness  x  Right side feels plugged and little swollen   Cough  x  Mild   Wheeze   x    Chest Pain   x    Shortness of breath   x    Rash   x    Other  x  Headaches - frontal     Symptom duration:  1 week   Symptom severity:  moderate   Treatments tried:  None   Contacts:  Exposed to strep on 8/7/2017     Always has some sinus pressure, has not been using flonase  She did start HCTZ for a few days then stopped, swelling has been better.  Drinking/eating okay    Problem list and histories reviewed & adjusted, as indicated.  Additional history: none    ROS:  Other than noted above, general, HEENT, respiratory, cardiac, MS, and gastrointestinal systems are negative.     OBJECTIVE:                                                    /82 (BP Location: Right arm, Patient Position: Sitting, Cuff Size: Adult Large)  Pulse 81  Temp 98.8  F (37.1  C) (Tympanic)  Wt 264 lb 12.8 oz (120.1 kg)  SpO2 97%  BMI 40.14 kg/m2 Body mass index is 40.14 kg/(m^2).   GENERAL: healthy, alert, well nourished, well hydrated, no distress  HENT: ear canals- normal; TMs- normal; Nose- normal; Mouth- no ulcers, no lesions POSITIVE tonsils bilaterally 2+ but non-erythematous. Uvula midline  NECK: no tenderness, POSITIVE bilateral submandibular adenopathy, right upper anterior cervical lymph node, mild tenderness at right jaw just above this, no asymmetry, no masses, no stiffness; thyroid- normal to palpation  RESP: lungs clear to auscultation - no rales, no  rhonchi, no wheezes  CV: regular rates and rhythm, normal S1 S2, no S3 or S4 and no murmur, no click or rub -  ABDOMEN: soft, no tenderness, no  hepatosplenomegaly, no masses, normal bowel sounds  MS: extremities- no gross deformities noted, 1+ bilateral edema     ASSESSMENT/PLAN:                                                      ASSESSMENT/PLAN:      ICD-10-CM    1. Throat pain R07.0 Strep, Rapid Screen     Throat Culture Aerobic Bacterial     amoxicillin (AMOXIL) 875 MG tablet       Patient Instructions   Have plenty of rest and fluids  Use nasal spray Afrin OTC for 3-4 days in each nostril for congestion  Saltwater gargles, throat lozenges  We'll let you know what throat culture shows  Monitor the lymph node as well  Follow up if worsening symptoms or if not improving    Alexandra Arnold PA-C   [facility]

## 2017-08-14 NOTE — MR AVS SNAPSHOT
After Visit Summary   8/14/2017    Josette Foster    MRN: [id number]           Patient Information     Date Of Birth          1970        Visit Information        Provider Department      8/14/2017 1:40 PM Alexandra Arnold PA-C [facility]        Today's Diagnoses     Throat pain    -  1      Care Instructions    Have plenty of rest and fluids  Use nasal spray Afrin OTC for 3-4 days in each nostril for congestion  Saltwater gargles, throat lozenges  We'll let you know what throat culture shows  Monitor the lymph node as well  Follow up if worsening symptoms or if not improving          Follow-ups after your visit        Who to contact     Normal or non-critical lab and imaging results will be communicated to you by [employer], letter or phone within 4 business days after the clinic has received the results. If you do not hear from us within 7 days, please contact the clinic through [employer] or phone. If you have a critical or abnormal lab result, we will notify you by phone as soon as possible.  Submit refill requests through [employer] or call your pharmacy and they will forward the refill request to us. Please allow 3 business days for your refill to be completed.          If you need to speak with a  for additional information , please call: [phone]             Additional Information About Your Visit        [employer] Information     [employer] gives you secure access to your electronic health record. If you see a primary care provider, you can also send messages to your care team and make appointments. If you have questions, please call your primary care clinic.  If you do not have a primary care provider, please call [phone] and they will assist you.        Care EveryWhere ID     This is your Care EveryWhere ID. This could be used by other organizations to access your [facility] medical records  [id number]        Your Vitals Were     Pulse Temperature Pulse Oximetry  BMI (Body Mass Index)          81 98.8  F (37.1  C) (Tympanic) 97% 40.14 kg/m2         Blood Pressure from Last 3 Encounters:   08/14/17 133/82   08/03/17 127/75   03/22/17 127/81    Weight from Last 3 Encounters:   08/14/17 264 lb 12.8 oz (120.1 kg)   08/03/17 265 lb (120.2 kg)   03/22/17 257 lb 3.2 oz (116.7 kg)              We Performed the Following     Strep, Rapid Screen     Throat Culture Aerobic Bacterial        Primary Care Provider Office Phone #    [facility] [phone]       No address on file        Equal Access to Services     RICCO VELASCO : Keren Reddy, estelle zhao, rachel marie, caio rojas. So [facility] [phone].    ATENCIÓN: Si habla español, tiene a barnett disposición servicios gratuitos de asistencia lingüística. [facility] [phone].    We comply with applicable federal civil rights laws and [place] laws. We do not discriminate on the basis of race, color, national origin, age, disability sex, sexual orientation or gender identity.            Thank you!     Thank you for choosing [facility]  for your care. Our goal is always to provide you with excellent care. Hearing back from our patients is one way we can continue to improve our services. Please take a few minutes to complete the written survey that you may receive in the mail after your visit with us. Thank you!             Your Updated Medication List - Protect others around you: Learn how to safely use, store and throw away your medicines at www.disposemymeds.org.          This list is accurate as of: 8/14/17  2:08 PM.  Always use your most recent med list.                   Brand Name Dispense Instructions for use Diagnosis    atorvastatin 20 MG tablet    LIPITOR          fluticasone 50 MCG/ACT spray    FLONASE    1 Bottle    Spray 1 spray into both nostrils daily    Post-nasal drip       hydrochlorothiazide 12.5 MG Tabs tablet     30 tablet    Take 1  tablet (12.5 mg) by mouth daily    Edema of both legs       levothyroxine 75 MCG tablet    SYNTHROID/LEVOTHROID    90 tablet    TAKE 1 TABLET(75 MCG) BY MOUTH DAILY    Hypothyroidism, unspecified type       nabumetone 750 MG tablet    RELAFEN     Take 750 mg by mouth 2 times daily PRN        propranolol 20 MG tablet    INDERAL    60 tablet    Take 1 tablet (20 mg) by mouth 2 times daily    Migraine with aura and without status migrainosus, not intractable       tiZANidine 4 MG tablet    ZANAFLEX    30 tablet    Take 1-2 tablets (4-8 mg) by mouth 3 times daily as needed for muscle spasms    Muscle spasms of neck

## 2017-08-17 LAB
BACTERIA SPEC CULT: ABNORMAL
SPECIMEN SOURCE: ABNORMAL

## 2017-08-18 RX ORDER — AMOXICILLIN 875 MG
875 TABLET ORAL 2 TIMES DAILY
Qty: 20 TABLET | Refills: 0 | Status: SHIPPED | OUTPATIENT
Start: 2017-08-18 | End: 2017-12-24

## 2017-12-24 ENCOUNTER — OFFICE VISIT (OUTPATIENT)
Dept: URGENT CARE | Facility: URGENT CARE | Age: 47
End: 2017-12-24
Payer: COMMERCIAL

## 2017-12-24 ENCOUNTER — RADIANT APPOINTMENT (OUTPATIENT)
Dept: GENERAL RADIOLOGY | Facility: CLINIC | Age: 47
End: 2017-12-24
Attending: NURSE PRACTITIONER
Payer: COMMERCIAL

## 2017-12-24 VITALS
TEMPERATURE: 97.9 F | RESPIRATION RATE: 16 BRPM | DIASTOLIC BLOOD PRESSURE: 92 MMHG | SYSTOLIC BLOOD PRESSURE: 137 MMHG | BODY MASS INDEX: 39.87 KG/M2 | WEIGHT: 263 LBS | HEART RATE: 77 BPM | OXYGEN SATURATION: 98 %

## 2017-12-24 DIAGNOSIS — R52 PAIN: ICD-10-CM

## 2017-12-24 DIAGNOSIS — S60.222A CONTUSION OF MULTIPLE SITES OF LEFT HAND AND WRIST, INITIAL ENCOUNTER: Primary | ICD-10-CM

## 2017-12-24 DIAGNOSIS — M25.532 LEFT WRIST PAIN: ICD-10-CM

## 2017-12-24 DIAGNOSIS — S60.212A CONTUSION OF MULTIPLE SITES OF LEFT HAND AND WRIST, INITIAL ENCOUNTER: Primary | ICD-10-CM

## 2017-12-24 PROCEDURE — 73110 X-RAY EXAM OF WRIST: CPT | Mod: LT

## 2017-12-24 PROCEDURE — 99213 OFFICE O/P EST LOW 20 MIN: CPT | Performed by: NURSE PRACTITIONER

## 2017-12-24 NOTE — PROGRESS NOTES
SUBJECTIVE:   Josette Foster is a 47 year old female who presents to clinic today for the following health issues:    Joint Pain    Onset: Wednesday     Description:   Location: left wrist , hand and arm     Character: Sharp, Dull ache, Stabbing     Intensity: moderate    Progression of Symptoms: same    Accompanying Signs & Symptoms:  Other symptoms: Tingling and bruising     History:   Previous similar pain: no       Precipitating factors:   Trauma or overuse: YES- was putting dog outside on leash and dog yanked arm into the door.     Alleviating factors:  Improved by: nothing    Therapies Tried and outcome: Aleve, ice in beginning, and some heat              Problem list and histories reviewed & adjusted, as indicated.  Additional history: as documented    Patient Active Problem List   Diagnosis     Obesity     Hypothyroidism     Migraine     Infertility female     HYPERLIPIDEMIA LDL GOAL <160     Esophagitis     Gastroesophageal reflux disease with esophagitis     Past Surgical History:   Procedure Laterality Date     C ORAL SURGERY PROCEDURE  1979 wisdom     ESOPHAGOSCOPY, GASTROSCOPY, DUODENOSCOPY (EGD), COMBINED N/A 12/15/2015    Procedure: COMBINED ESOPHAGOSCOPY, GASTROSCOPY, DUODENOSCOPY (EGD), BIOPSY SINGLE OR MULTIPLE;  Surgeon: Yamilka Chapin MD;  Location: [facility]     HC REMOVAL GALLBLADDER  2003       Social History   Substance Use Topics     Smoking status: Never Smoker     Smokeless tobacco: Never Used      Comment: no smokers in household     Alcohol use Yes      Comment: occ     Family History   Problem Relation Age of Onset     HEART DISEASE Mother      murmur     GASTROINTESTINAL DISEASE Mother      gallbladder removal     DIABETES Mother      Migraines Mother      Respiratory Father      emphysema     Thyroid Disease Father      Peptic Ulcer Disease Father      CANCER Maternal Grandmother      breast age 80     HEART DISEASE Maternal Grandfather      hypertension     HEART  DISEASE Brother      MI at 35, smoker     Depression Brother      HEART DISEASE Paternal Grandfather      MI     GASTROINTESTINAL DISEASE Sister      gallbladder removal     Depression Sister      Migraines Sister      Migraines Nephew      Mom Kadie         Current Outpatient Prescriptions   Medication Sig Dispense Refill     order for DME Equipment being ordered: left wrist/hand splint, velcro 1 Device 0     atorvastatin (LIPITOR) 20 MG tablet TAKE 1 TABLET(20 MG) BY MOUTH DAILY 90 tablet 1     atorvastatin (LIPITOR) 20 MG tablet   1     levothyroxine (SYNTHROID/LEVOTHROID) 75 MCG tablet TAKE 1 TABLET(75 MCG) BY MOUTH DAILY 90 tablet 2     propranolol (INDERAL) 20 MG tablet Take 1 tablet (20 mg) by mouth 2 times daily 60 tablet 1     fluticasone (FLONASE) 50 MCG/ACT spray Spray 1 spray into both nostrils daily 1 Bottle 1     tiZANidine (ZANAFLEX) 4 MG tablet Take 1-2 tablets (4-8 mg) by mouth 3 times daily as needed for muscle spasms 30 tablet 0     nabumetone (RELAFEN) 750 MG tablet Take 750 mg by mouth 2 times daily PRN       Allergies   Allergen Reactions     Atorvastatin Other (See Comments)     Diarrhea and cramping     Ibuprofen      Headaches     Labs reviewed in EPIC      Reviewed and updated as needed this visit by clinical staffTobacco  Allergies  Meds  Problems  Med Hx  Surg Hx  Fam Hx  Soc Hx        Reviewed and updated as needed this visit by Provider  Allergies  Meds  Problems         ROS:  Constitutional, HEENT, cardiovascular, pulmonary, GI, , musculoskeletal, neuro, skin, endocrine and psych systems are negative, except as otherwise noted.      OBJECTIVE:   BP (!) 137/92 (BP Location: Right arm, Cuff Size: Adult Large)  Pulse 77  Temp 97.9  F (36.6  C) (Tympanic)  Resp 16  Wt 263 lb (119.3 kg)  SpO2 98%  BMI 39.87 kg/m2  Body mass index is 39.87 kg/(m^2).   GENERAL: healthy, alert and no distress, nontoxic in appearance  EYES: Eyes grossly normal to inspection, PERRL and  conjunctivae and sclerae normal  HENT: normocephalic and atraumatic  NECK: supple with full ROM  MS: small contusion on top of left hand at base of index finger. Left wrist and lower forearm mild bruised on anterior aspect. Can pronate and supinate with issue/pain.    Diagnostic Test Results:LEFT WRIST THREE VIEWS 12/24/2017 1:20 PM      HISTORY: Pain.     COMPARISON: None.         IMPRESSION: Normal.     JENNIFER CHEN MD  No results found for this or any previous visit (from the past 24 hour(s)).    ASSESSMENT/PLAN:     Problem List Items Addressed This Visit     None      Visit Diagnoses     Contusion of multiple sites of left hand and wrist, initial encounter    -  Primary    Relevant Medications    order for DME    Left wrist pain                   Patient Instructions     RICE advice    Brace for comfort    Follow up with primary care provider as needed if symptoms worsen or do not resolve.      Indications for emergent return to emergency department discussed with patient, who verbalized good understanding and agreement.  Patient understands the limitations of today's evaluation.         R.I.C.E.    R.I.C.E. stands for Rest, Ice, Compression, and Elevation. Doing these things helps limit pain and swelling after an injury. R.I.C.E. also helps injuries heal faster. Use R.I.C.E. for sprains, strains, and severe bruises or bumps. Follow the tips on this handout and begin R.I.C.E. as soon as possible after an injury.  ? Rest  Pain is your body s way of telling you to rest an injured area. Whether you have hurt an elbow, hand, foot, or knee, limiting its use will prevent further injury and help you heal.  ? Ice  Applying ice right after an injury helps prevent swelling and reduce pain. Don t place ice directly on your skin.    Wrap a cold pack or bag of ice in a thin cloth. Place it over the injured area.    Ice for 10 minutes every 3 hours. Don t ice for more than 20 minutes at a time.  ? Compression  Putting  pressure (compression) on an injury helps prevent swelling and provides support.    Wrap the injured area firmly with an elastic bandage. If your hand or foot tingles, becomes discolored, or feels cold to the touch, the bandage may be too tight. Rewrap it more loosely.    If your bandage becomes too loose, rewrap it.    Do not wear an elastic bandage overnight.  ? Elevation  Keeping an injury elevated helps reduce swelling, pain, and throbbing. Elevation is most effective when the injury is kept elevated higher than the heart.     Call your healthcare provider if you notice any of the following:    Fingers or toes feel numb, are cold to the touch, or change color    Skin looks shiny or tight    Pain, swelling, or bruising worsens and is not improved with elevation   Date Last Reviewed: 9/3/2015    8948-0378 [employer]. [address] [place] [address]. All rights reserved. This information is not intended as a substitute for professional medical care. Always follow your healthcare professional's instructions.        Hand Contusion  You have a contusion. This is also called a bruise. There is swelling and some bleeding under the skin, but no broken bones. This injury generally takes a few days to a few weeks to heal.  During that time, the bruise will typically change in color from reddish, to purple-blue, to greenish-yellow, then to yellow-brown.  Home care    Elevate the hand to reduce pain and swelling. As much as possible, sit or lie down with the hand raised about the level of your heart. This is especially important during the first 48 hours.    Ice the hand to help reduce pain and swelling. Wrap a cold source (ice pack or ice cubes in a plastic bag) in a thin towel. Apply to the bruised area for 20 minutes every 1 to 2 hours the first day. Continue this 3 to 4 times a day until the pain and swelling goes away.    Unless another medicine was prescribed, you can take acetaminophen,  ibuprofen, or naproxen to control pain. (If you have chronic liver or kidney disease or ever had a stomach ulcer or gastrointestinal bleeding, talk with your doctor before using these medicines.)  Follow up  Follow up with your healthcare provider or our staff as advised. Call if you are not improving within 1 to 2 weeks.  When to seek medical advice   Call your healthcare provider right away if you have any of the following:    Increased pain or swelling    Arm becomes cold, blue, numb or tingly    Signs of infection: Warmth, drainage, or increased redness or pain around the bruise    Inability to move the injured hand     Frequent bruising for unknown reasons  Date Last Reviewed: 2/1/2017 2000-2017 [employer]. [address] [place] [address], [place] [address]. All rights reserved. This information is not intended as a substitute for professional medical care. Always follow your healthcare professional's instructions.        Upper Extremity Contusion  You have a contusion (bruise) of an upper extremity (arm, wrist, hand, or fingers). Symptoms include pain, swelling, and skin discoloration. No bones are broken. This injury may take from a few days to a few weeks to heal.  During that time, the bruise may change from reddish in color, to purple-blue, to green-yellow, to yellow-brown.  Home care    Unless another medicine was prescribed, you can take acetaminophen, ibuprofen, or naproxen to control pain. (If you have chronic liver or kidney disease or ever had a stomach ulcer or gastrointestinal bleeding, talk with your doctor before using these medicines.)    Elevate the injured area to reduce pain and swelling. As much as possible, sit or lie down with the injured area raised about the level of your heart. This is especially important during the first 48 hours.    Ice the injured area to help reduce pain and swelling. Wrap a cold source (ice pack or ice cubes in a plastic bag) in a thin towel. Apply to the  bruised area for 20 minutes every 1 to 2 hours the first day. Continue this 3 to 4 times a day until the pain and swelling goes away.    If a sling was provided, you may remove it to shower or bathe. To prevent joint stiffness, do not wear it for more than 1 week.  Follow-up care  Follow up with your healthcare provide, or as advised. Call if you are not improving within the next 1 to 2 weeks.  When to seek medical advice   Call your healthcare provider right away if any of these occur:    Increased pain or swelling    Hand or fingers become cold, blue, numb or tingly    Signs of infection: Warmth, drainage, or increased redness or pain around the injury    Inability to move the injured body part     Frequent bruising for unknown reasons  Date Last Reviewed: 2/1/2017 2000-2017 [employer]. [address] [place] [address], [place] [address]. All rights reserved. This information is not intended as a substitute for professional medical care. Always follow your healthcare professional's instructions.            NIKHIL Morales [facility]

## 2017-12-24 NOTE — MR AVS SNAPSHOT
After Visit Summary   12/24/2017    Josette Foster    MRN: [id number]           Patient Information     Date Of Birth          1970        Visit Information        Provider Department      12/24/2017 10:30 AM Jeanine Tucker APRN [facility]        Today's Diagnoses     Contusion of multiple sites of left hand and wrist, initial encounter    -  1    Left wrist pain          Care Instructions    RICE advice    Brace for comfort    Follow up with primary care provider as needed if symptoms worsen or do not resolve.      Indications for emergent return to emergency department discussed with patient, who verbalized good understanding and agreement.  Patient understands the limitations of today's evaluation.         R.I.C.E.    R.I.C.E. stands for Rest, Ice, Compression, and Elevation. Doing these things helps limit pain and swelling after an injury. R.I.C.E. also helps injuries heal faster. Use R.I.C.E. for sprains, strains, and severe bruises or bumps. Follow the tips on this handout and begin R.I.C.E. as soon as possible after an injury.  ? Rest  Pain is your body s way of telling you to rest an injured area. Whether you have hurt an elbow, hand, foot, or knee, limiting its use will prevent further injury and help you heal.  ? Ice  Applying ice right after an injury helps prevent swelling and reduce pain. Don t place ice directly on your skin.    Wrap a cold pack or bag of ice in a thin cloth. Place it over the injured area.    Ice for 10 minutes every 3 hours. Don t ice for more than 20 minutes at a time.  ? Compression  Putting pressure (compression) on an injury helps prevent swelling and provides support.    Wrap the injured area firmly with an elastic bandage. If your hand or foot tingles, becomes discolored, or feels cold to the touch, the bandage may be too tight. Rewrap it more loosely.    If your bandage becomes too loose, rewrap it.    Do not  wear an elastic bandage overnight.  ? Elevation  Keeping an injury elevated helps reduce swelling, pain, and throbbing. Elevation is most effective when the injury is kept elevated higher than the heart.     Call your healthcare provider if you notice any of the following:    Fingers or toes feel numb, are cold to the touch, or change color    Skin looks shiny or tight    Pain, swelling, or bruising worsens and is not improved with elevation   Date Last Reviewed: 9/3/2015    6595-7203 [employer]. [address] [place] [address]. All rights reserved. This information is not intended as a substitute for professional medical care. Always follow your healthcare professional's instructions.        Hand Contusion  You have a contusion. This is also called a bruise. There is swelling and some bleeding under the skin, but no broken bones. This injury generally takes a few days to a few weeks to heal.  During that time, the bruise will typically change in color from reddish, to purple-blue, to greenish-yellow, then to yellow-brown.  Home care    Elevate the hand to reduce pain and swelling. As much as possible, sit or lie down with the hand raised about the level of your heart. This is especially important during the first 48 hours.    Ice the hand to help reduce pain and swelling. Wrap a cold source (ice pack or ice cubes in a plastic bag) in a thin towel. Apply to the bruised area for 20 minutes every 1 to 2 hours the first day. Continue this 3 to 4 times a day until the pain and swelling goes away.    Unless another medicine was prescribed, you can take acetaminophen, ibuprofen, or naproxen to control pain. (If you have chronic liver or kidney disease or ever had a stomach ulcer or gastrointestinal bleeding, talk with your doctor before using these medicines.)  Follow up  Follow up with your healthcare provider or our staff as advised. Call if you are not improving within 1 to 2 weeks.  When to  seek medical advice   Call your healthcare provider right away if you have any of the following:    Increased pain or swelling    Arm becomes cold, blue, numb or tingly    Signs of infection: Warmth, drainage, or increased redness or pain around the bruise    Inability to move the injured hand     Frequent bruising for unknown reasons  Date Last Reviewed: 2/1/2017 2000-2017 [employer]. [address] [place] [address]. All rights reserved. This information is not intended as a substitute for professional medical care. Always follow your healthcare professional's instructions.        Upper Extremity Contusion  You have a contusion (bruise) of an upper extremity (arm, wrist, hand, or fingers). Symptoms include pain, swelling, and skin discoloration. No bones are broken. This injury may take from a few days to a few weeks to heal.  During that time, the bruise may change from reddish in color, to purple-blue, to green-yellow, to yellow-brown.  Home care    Unless another medicine was prescribed, you can take acetaminophen, ibuprofen, or naproxen to control pain. (If you have chronic liver or kidney disease or ever had a stomach ulcer or gastrointestinal bleeding, talk with your doctor before using these medicines.)    Elevate the injured area to reduce pain and swelling. As much as possible, sit or lie down with the injured area raised about the level of your heart. This is especially important during the first 48 hours.    Ice the injured area to help reduce pain and swelling. Wrap a cold source (ice pack or ice cubes in a plastic bag) in a thin towel. Apply to the bruised area for 20 minutes every 1 to 2 hours the first day. Continue this 3 to 4 times a day until the pain and swelling goes away.    If a sling was provided, you may remove it to shower or bathe. To prevent joint stiffness, do not wear it for more than 1 week.  Follow-up care  Follow up with your healthcare provide, or as  advised. Call if you are not improving within the next 1 to 2 weeks.  When to seek medical advice   Call your healthcare provider right away if any of these occur:    Increased pain or swelling    Hand or fingers become cold, blue, numb or tingly    Signs of infection: Warmth, drainage, or increased redness or pain around the injury    Inability to move the injured body part     Frequent bruising for unknown reasons  Date Last Reviewed: 2/1/2017 2000-2017 [employer]. [address] [place] [address]. All rights reserved. This information is not intended as a substitute for professional medical care. Always follow your healthcare professional's instructions.                Follow-ups after your visit        Follow-up notes from your care team     See patient instructions section of the AVS Return if symptoms worsen or fail to improve, for Follow up with your primary care provider.      Who to contact     If you have questions or need follow up information about today's clinic visit or your schedule please contact [facility] directly at [phone].  Normal or non-critical lab and imaging results will be communicated to you by [employer], letter or phone within 4 business days after the clinic has received the results. If you do not hear from us within 7 days, please contact the clinic through [employer] or phone. If you have a critical or abnormal lab result, we will notify you by phone as soon as possible.  Submit refill requests through [employer] or call your pharmacy and they will forward the refill request to us. Please allow 3 business days for your refill to be completed.          Additional Information About Your Visit        [employer] Information     [employer] gives you secure access to your electronic health record. If you see a primary care provider, you can also send messages to your care team and make appointments. If you have questions, please call your  primary care clinic.  If you do not have a primary care provider, please call [phone] and they will assist you.        Care EveryWhere ID     This is your Care EveryWhere ID. This could be used by other organizations to access your [place] medical records  [id number]        Your Vitals Were     Pulse Temperature Respirations Pulse Oximetry BMI (Body Mass Index)       77 97.9  F (36.6  C) (Tympanic) 16 98% 39.87 kg/m2        Blood Pressure from Last 3 Encounters:   12/24/17 (!) 137/92   08/14/17 133/82   08/03/17 127/75    Weight from Last 3 Encounters:   12/24/17 263 lb (119.3 kg)   08/14/17 264 lb 12.8 oz (120.1 kg)   08/03/17 265 lb (120.2 kg)              Today, you had the following     No orders found for display         Today's Medication Changes          These changes are accurate as of: 12/24/17  1:42 PM.  If you have any questions, ask your nurse or doctor.               Start taking these medicines.        Dose/Directions    order for DME   Used for:  Contusion of multiple sites of left hand and wrist, initial encounter   Started by:  Jeanine Tucker APRN CNP        Equipment being ordered: left wrist/hand splint, velcro   Quantity:  1 Device   Refills:  0            Where to get your medicines      Some of these will need a paper prescription and others can be bought over the counter.  Ask your nurse if you have questions.     Bring a paper prescription for each of these medications     order for DME                Primary Care Provider Office Phone # Fax #    [facility] [phone] [address] [facility] [address]        Equal Access to Services     [facility] AH: Hadii manuelito burton hadasho Soomaali, waaxda luqadaha, qaybta kaalmada caio marie. So [facility] [phone].    ATENCIÓN: Si habla español, tiene a barnett disposición servicios gratuitos de asistencia lingüística. Llame al [phone].    We comply with applicable  federal civil rights laws and [place] laws. We do not discriminate on the basis of race, color, national origin, age, disability, sex, sexual orientation, or gender identity.            Thank you!     Thank you for choosing [facility]  for your care. Our goal is always to provide you with excellent care. Hearing back from our patients is one way we can continue to improve our services. Please take a few minutes to complete the written survey that you may receive in the mail after your visit with us. Thank you!             Your Updated Medication List - Protect others around you: Learn how to safely use, store and throw away your medicines at www.disposemymeds.org.          This list is accurate as of: 12/24/17  1:42 PM.  Always use your most recent med list.                   Brand Name Dispense Instructions for use Diagnosis    * atorvastatin 20 MG tablet    LIPITOR          * atorvastatin 20 MG tablet    LIPITOR    90 tablet    TAKE 1 TABLET(20 MG) BY MOUTH DAILY    Hyperlipidemia LDL goal <130       fluticasone 50 MCG/ACT spray    FLONASE    1 Bottle    Spray 1 spray into both nostrils daily    Post-nasal drip       levothyroxine 75 MCG tablet    SYNTHROID/LEVOTHROID    90 tablet    TAKE 1 TABLET(75 MCG) BY MOUTH DAILY    Hypothyroidism, unspecified type       nabumetone 750 MG tablet    RELAFEN     Take 750 mg by mouth 2 times daily PRN        order for DME     1 Device    Equipment being ordered: left wrist/hand splint, velcro    Contusion of multiple sites of left hand and wrist, initial encounter       propranolol 20 MG tablet    INDERAL    60 tablet    Take 1 tablet (20 mg) by mouth 2 times daily    Migraine with aura and without status migrainosus, not intractable       tiZANidine 4 MG tablet    ZANAFLEX    30 tablet    Take 1-2 tablets (4-8 mg) by mouth 3 times daily as needed for muscle spasms    Muscle spasms of neck       * Notice:  This list has 2 medication(s) that  are the same as other medications prescribed for you. Read the directions carefully, and ask your doctor or other care provider to review them with you.

## 2017-12-24 NOTE — PATIENT INSTRUCTIONS
RICE advice    Brace for comfort    Follow up with primary care provider as needed if symptoms worsen or do not resolve.      Indications for emergent return to emergency department discussed with patient, who verbalized good understanding and agreement.  Patient understands the limitations of today's evaluation.         R.I.C.E.    R.I.C.E. stands for Rest, Ice, Compression, and Elevation. Doing these things helps limit pain and swelling after an injury. R.I.C.E. also helps injuries heal faster. Use R.I.C.E. for sprains, strains, and severe bruises or bumps. Follow the tips on this handout and begin R.I.C.E. as soon as possible after an injury.  ? Rest  Pain is your body s way of telling you to rest an injured area. Whether you have hurt an elbow, hand, foot, or knee, limiting its use will prevent further injury and help you heal.  ? Ice  Applying ice right after an injury helps prevent swelling and reduce pain. Don t place ice directly on your skin.    Wrap a cold pack or bag of ice in a thin cloth. Place it over the injured area.    Ice for 10 minutes every 3 hours. Don t ice for more than 20 minutes at a time.  ? Compression  Putting pressure (compression) on an injury helps prevent swelling and provides support.    Wrap the injured area firmly with an elastic bandage. If your hand or foot tingles, becomes discolored, or feels cold to the touch, the bandage may be too tight. Rewrap it more loosely.    If your bandage becomes too loose, rewrap it.    Do not wear an elastic bandage overnight.  ? Elevation  Keeping an injury elevated helps reduce swelling, pain, and throbbing. Elevation is most effective when the injury is kept elevated higher than the heart.     Call your healthcare provider if you notice any of the following:    Fingers or toes feel numb, are cold to the touch, or change color    Skin looks shiny or tight    Pain, swelling, or bruising worsens and is not improved with elevation   Date Last  Reviewed: 9/3/2015    8887-0745 [employer]. [address] [place] [address]. All rights reserved. This information is not intended as a substitute for professional medical care. Always follow your healthcare professional's instructions.        Hand Contusion  You have a contusion. This is also called a bruise. There is swelling and some bleeding under the skin, but no broken bones. This injury generally takes a few days to a few weeks to heal.  During that time, the bruise will typically change in color from reddish, to purple-blue, to greenish-yellow, then to yellow-brown.  Home care    Elevate the hand to reduce pain and swelling. As much as possible, sit or lie down with the hand raised about the level of your heart. This is especially important during the first 48 hours.    Ice the hand to help reduce pain and swelling. Wrap a cold source (ice pack or ice cubes in a plastic bag) in a thin towel. Apply to the bruised area for 20 minutes every 1 to 2 hours the first day. Continue this 3 to 4 times a day until the pain and swelling goes away.    Unless another medicine was prescribed, you can take acetaminophen, ibuprofen, or naproxen to control pain. (If you have chronic liver or kidney disease or ever had a stomach ulcer or gastrointestinal bleeding, talk with your doctor before using these medicines.)  Follow up  Follow up with your healthcare provider or our staff as advised. Call if you are not improving within 1 to 2 weeks.  When to seek medical advice   Call your healthcare provider right away if you have any of the following:    Increased pain or swelling    Arm becomes cold, blue, numb or tingly    Signs of infection: Warmth, drainage, or increased redness or pain around the bruise    Inability to move the injured hand     Frequent bruising for unknown reasons  Date Last Reviewed: 2/1/2017 2000-2017 [employer]. [address] [place] [address]. All rights  reserved. This information is not intended as a substitute for professional medical care. Always follow your healthcare professional's instructions.        Upper Extremity Contusion  You have a contusion (bruise) of an upper extremity (arm, wrist, hand, or fingers). Symptoms include pain, swelling, and skin discoloration. No bones are broken. This injury may take from a few days to a few weeks to heal.  During that time, the bruise may change from reddish in color, to purple-blue, to green-yellow, to yellow-brown.  Home care    Unless another medicine was prescribed, you can take acetaminophen, ibuprofen, or naproxen to control pain. (If you have chronic liver or kidney disease or ever had a stomach ulcer or gastrointestinal bleeding, talk with your doctor before using these medicines.)    Elevate the injured area to reduce pain and swelling. As much as possible, sit or lie down with the injured area raised about the level of your heart. This is especially important during the first 48 hours.    Ice the injured area to help reduce pain and swelling. Wrap a cold source (ice pack or ice cubes in a plastic bag) in a thin towel. Apply to the bruised area for 20 minutes every 1 to 2 hours the first day. Continue this 3 to 4 times a day until the pain and swelling goes away.    If a sling was provided, you may remove it to shower or bathe. To prevent joint stiffness, do not wear it for more than 1 week.  Follow-up care  Follow up with your healthcare provide, or as advised. Call if you are not improving within the next 1 to 2 weeks.  When to seek medical advice   Call your healthcare provider right away if any of these occur:    Increased pain or swelling    Hand or fingers become cold, blue, numb or tingly    Signs of infection: Warmth, drainage, or increased redness or pain around the injury    Inability to move the injured body part     Frequent bruising for unknown reasons  Date Last Reviewed: 2/1/2017 2000-2017  [employer]. [address] [place] [address], [place] [address]. All rights reserved. This information is not intended as a substitute for professional medical care. Always follow your healthcare professional's instructions.

## 2017-12-24 NOTE — NURSING NOTE
"Chief Complaint   Patient presents with     Musculoskeletal Problem       Initial BP (!) 137/92 (BP Location: Right arm, Cuff Size: Adult Large)  Pulse 77  Temp 97.9  F (36.6  C) (Tympanic)  Resp 16  Wt 263 lb (119.3 kg)  SpO2 98%  BMI 39.87 kg/m2 Estimated body mass index is 39.87 kg/(m^2) as calculated from the following:    Height as of 8/3/17: 5' 8.1\" (1.73 m).    Weight as of this encounter: 263 lb (119.3 kg).  Medication Reconciliation: complete    Health Maintenance that is potentially due pending provider review:  NONE    n/a    Is there anyone who you would like to be able to receive your results? Not Applicable  If yes have patient fill out LUCILLE  Rach Rivas M.A.        "

## 2018-01-16 ENCOUNTER — OFFICE VISIT (OUTPATIENT)
Dept: OTOLARYNGOLOGY | Facility: CLINIC | Age: 48
End: 2018-01-16
Payer: COMMERCIAL

## 2018-01-16 VITALS
WEIGHT: 263 LBS | DIASTOLIC BLOOD PRESSURE: 87 MMHG | HEIGHT: 68 IN | SYSTOLIC BLOOD PRESSURE: 127 MMHG | TEMPERATURE: 98.1 F | HEART RATE: 84 BPM | BODY MASS INDEX: 39.86 KG/M2

## 2018-01-16 DIAGNOSIS — H81.11 BENIGN PAROXYSMAL POSITIONAL VERTIGO OF RIGHT EAR: ICD-10-CM

## 2018-01-16 DIAGNOSIS — J32.0 CHRONIC MAXILLARY SINUSITIS: Primary | ICD-10-CM

## 2018-01-16 PROCEDURE — 99203 OFFICE O/P NEW LOW 30 MIN: CPT | Performed by: OTOLARYNGOLOGY

## 2018-01-16 NOTE — LETTER
1/16/2018         RE: Josette Foster  [address]  [address]  [facility] [address]        Dear Colleague,    Thank you for referring your patient, Josette Foster, to the [facility]. Please see a copy of my visit note below.        History of Present Illness - Josette Foster is a 47 year old female who presents with concerns about her sinuses.  She reports that her nose and ears feel congested for greater than the past 3 months.  She was treated in August by her primary care physician for acute sinusitis.  She takes Flonase on a daily basis and uses Afrin nasal spray approximately 3 times per week.  She also reports a sense of postnasal drainage and ear fullness.    In addition, she reports recurrent symptoms of vertigo.  She describes lying down in bed and noticing that the room will tend to spin for approximately 30 seconds.  She sometimes is off balance for several hours following this.  She denies any associated hearing changes.    Past Medical History -   Patient Active Problem List   Diagnosis     Obesity     Hypothyroidism     Migraine     Infertility female     HYPERLIPIDEMIA LDL GOAL <160     Esophagitis     Gastroesophageal reflux disease with esophagitis       Current Medications -   Current Outpatient Prescriptions:      order for DME, Equipment being ordered: left wrist/hand splint, velcro, Disp: 1 Device, Rfl: 0     atorvastatin (LIPITOR) 20 MG tablet, TAKE 1 TABLET(20 MG) BY MOUTH DAILY, Disp: 90 tablet, Rfl: 1     atorvastatin (LIPITOR) 20 MG tablet, , Disp: , Rfl: 1     nabumetone (RELAFEN) 750 MG tablet, Take 750 mg by mouth 2 times daily PRN, Disp: , Rfl:      levothyroxine (SYNTHROID/LEVOTHROID) 75 MCG tablet, TAKE 1 TABLET(75 MCG) BY MOUTH DAILY, Disp: 90 tablet, Rfl: 2     propranolol (INDERAL) 20 MG tablet, Take 1 tablet (20 mg) by mouth 2 times daily, Disp: 60 tablet, Rfl: 1     fluticasone (FLONASE) 50 MCG/ACT spray, Spray 1 spray into both nostrils daily, Disp: 1 Bottle, Rfl:  1     tiZANidine (ZANAFLEX) 4 MG tablet, Take 1-2 tablets (4-8 mg) by mouth 3 times daily as needed for muscle spasms, Disp: 30 tablet, Rfl: 0    Allergies -   Allergies   Allergen Reactions     Atorvastatin Other (See Comments)     Diarrhea and cramping     Ibuprofen      Headaches       Social History -   Social History     Social History     Marital status:      Spouse name: Kuldip     Number of children: 0     Years of education: N/A     Occupational History     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  It has gone up since her back has been bothering her.     Back Care Yes     Exercise Yes     Walking     Seat Belt Yes     Self-Exams Yes     Social History Narrative       Family History -   Family History   Problem Relation Age of Onset     HEART DISEASE Mother      murmur     GASTROINTESTINAL DISEASE Mother      gallbladder removal     DIABETES Mother      Migraines Mother      Respiratory Father      emphysema     Thyroid Disease Father      Peptic Ulcer Disease Father      CANCER Maternal Grandmother      breast age 80     HEART DISEASE Maternal Grandfather      hypertension     HEART DISEASE Brother      MI at 35, smoker     Depression Brother      HEART DISEASE Paternal Grandfather      MI     GASTROINTESTINAL DISEASE Sister      gallbladder removal     Depression Sister      Migraines Sister      Migraines Nephew      Mom  Kadie       Review of Systems - As per HPI and PMHx, otherwise 7 system review of the head and neck negative. 10+ system review negative.    Physical Exam  There were no vitals taken for this visit.  General - The patient is well nourished and well developed, and appears to have good nutritional status.  Alert and oriented to person and place, answers questions and cooperates with examination appropriately.   Head and Face - Normocephalic and atraumatic, with no gross asymmetry noted of the contour of the facial features.  The facial nerve is intact, with strong symmetric movements.  Voice and Breathing - The patient was breathing comfortably without the use of accessory muscles. There was no wheezing, stridor, or stertor.  The patients voice was clear and strong, and had appropriate pitch and quality.  Ears - Bilateral pinna and EACs with normal appearing overlying skin. Tympanic membrane intact with good mobility on pneumatic otoscopy bilaterally. Bony landmarks of the ossicular chain are normal. The tympanic membranes are normal in appearance. No retraction, perforation, or masses.  No fluid or purulence was seen in the external canal or the middle ear.   Eyes - Extraocular movements intact.  Sclera were not icteric or injected, conjunctiva were pink and moist.  Mouth - Examination of the oral cavity showed pink, healthy oral mucosa. No lesions or ulcerations noted.  The tongue was mobile and midline, and the dentition were in good condition.    Throat - The walls of the oropharynx were smooth, pink, moist, symmetric, and had no lesions or ulcerations.  The tonsillar pillars and soft palate were symmetric.  The uvula was midline on elevation.  Neck - Normal midline excursion of the laryngotracheal complex during swallowing.  Full range of motion on passive movement.  Palpation of the occipital, submental, submandibular, internal jugular chain, and supraclavicular nodes did not demonstrate any abnormal lymph nodes  or masses.  The carotid pulse was palpable bilaterally.  Palpation of the thyroid was soft and smooth, with no nodules or goiter appreciated.  The trachea was mobile and midline.  Nose - External contour is symmetric, no gross deflection or scars.  Nasal mucosa is pink and moist with no abnormal mucus.  The septum was midline and non-obstructive, turbinates of normal size and position.  No polyps, masses, or purulence noted on examination.    Spencer-Hallpike maneuver was performed today.  She demonstrated subjective delayed vertigo when head was turned to the right.  She did not have discernible geotropic rotational nystagmus.  She had no response when the head was turned to the left.        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  I have suggested that she undergo treatment with physical therapy for the Epley maneuver.  She should return in 3-4 weeks to assess her progress.  Regarding her sinus concerns, I have ordered a CT scan of her sinuses to review on her return visit.  I advised her to continue the Flonase but discontinue Afrin at this point.      Dr. Coretta Wharton MD  Otolaryngology  [facility]        Again, thank you for allowing me to participate in the care of your patient.        Sincerely,        Coretta Wharton MD

## 2018-01-16 NOTE — NURSING NOTE
"Initial /87 (BP Location: Right arm, Patient Position: Sitting, Cuff Size: Adult Regular)  Pulse 84  Temp 98.1  F (36.7  C) (Oral)  Ht 1.727 m (5' 8\")  Wt 119.3 kg (263 lb)  BMI 39.99 kg/m2 Estimated body mass index is 39.99 kg/(m^2) as calculated from the following:    Height as of this encounter: 1.727 m (5' 8\").    Weight as of this encounter: 119.3 kg (263 lb). .    Erin Mccarty MA    "

## 2018-01-16 NOTE — PROGRESS NOTES
History of Present Illness - Josette Foster is a 47 year old female who presents with concerns about her sinuses.  She reports that her nose and ears feel congested for greater than the past 3 months.  She was treated in August by her primary care physician for acute sinusitis.  She takes Flonase on a daily basis and uses Afrin nasal spray approximately 3 times per week.  She also reports a sense of postnasal drainage and ear fullness.    In addition, she reports recurrent symptoms of vertigo.  She describes lying down in bed and noticing that the room will tend to spin for approximately 30 seconds.  She sometimes is off balance for several hours following this.  She denies any associated hearing changes.    Past Medical History -   Patient Active Problem List   Diagnosis     Obesity     Hypothyroidism     Migraine     Infertility female     HYPERLIPIDEMIA LDL GOAL <160     Esophagitis     Gastroesophageal reflux disease with esophagitis       Current Medications -   Current Outpatient Prescriptions:      order for DME, Equipment being ordered: left wrist/hand splint, velcro, Disp: 1 Device, Rfl: 0     atorvastatin (LIPITOR) 20 MG tablet, TAKE 1 TABLET(20 MG) BY MOUTH DAILY, Disp: 90 tablet, Rfl: 1     atorvastatin (LIPITOR) 20 MG tablet, , Disp: , Rfl: 1     nabumetone (RELAFEN) 750 MG tablet, Take 750 mg by mouth 2 times daily PRN, Disp: , Rfl:      levothyroxine (SYNTHROID/LEVOTHROID) 75 MCG tablet, TAKE 1 TABLET(75 MCG) BY MOUTH DAILY, Disp: 90 tablet, Rfl: 2     propranolol (INDERAL) 20 MG tablet, Take 1 tablet (20 mg) by mouth 2 times daily, Disp: 60 tablet, Rfl: 1     fluticasone (FLONASE) 50 MCG/ACT spray, Spray 1 spray into both nostrils daily, Disp: 1 Bottle, Rfl: 1     tiZANidine (ZANAFLEX) 4 MG tablet, Take 1-2 tablets (4-8 mg) by mouth 3 times daily as needed for muscle spasms, Disp: 30 tablet, Rfl: 0    Allergies -   Allergies   Allergen Reactions     Atorvastatin Other (See Comments)     Diarrhea  and cramping     Ibuprofen      Headaches       Social History -   Social History     Social History     Marital status:      Spouse name: Kuldip     Number of children: 0     Years of education: N/A     Occupational History     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  It has gone up since her back has been bothering her.     Back Care Yes     Exercise Yes     Walking     Seat Belt Yes     Self-Exams Yes     Social History Narrative       Family History -   Family History   Problem Relation Age of Onset     HEART DISEASE Mother      murmur     GASTROINTESTINAL DISEASE Mother      gallbladder removal     DIABETES Mother      Migraines Mother      Respiratory Father      emphysema     Thyroid Disease Father      Peptic Ulcer Disease Father      CANCER Maternal Grandmother      breast age 80     HEART DISEASE Maternal Grandfather      hypertension     HEART DISEASE Brother      MI at 35, smoker     Depression Brother      HEART DISEASE Paternal Grandfather      MI     GASTROINTESTINAL DISEASE Sister      gallbladder removal     Depression Sister      Migraines Sister      Migraines Nephew      Mom Kadie       Review of Systems - As per HPI and PMHx, otherwise 7 system review of the head and neck negative. 10+ system review negative.    Physical Exam  There were no vitals taken for this visit.  General - The patient is well nourished and  well developed, and appears to have good nutritional status.  Alert and oriented to person and place, answers questions and cooperates with examination appropriately.   Head and Face - Normocephalic and atraumatic, with no gross asymmetry noted of the contour of the facial features.  The facial nerve is intact, with strong symmetric movements.  Voice and Breathing - The patient was breathing comfortably without the use of accessory muscles. There was no wheezing, stridor, or stertor.  The patients voice was clear and strong, and had appropriate pitch and quality.  Ears - Bilateral pinna and EACs with normal appearing overlying skin. Tympanic membrane intact with good mobility on pneumatic otoscopy bilaterally. Bony landmarks of the ossicular chain are normal. The tympanic membranes are normal in appearance. No retraction, perforation, or masses.  No fluid or purulence was seen in the external canal or the middle ear.   Eyes - Extraocular movements intact.  Sclera were not icteric or injected, conjunctiva were pink and moist.  Mouth - Examination of the oral cavity showed pink, healthy oral mucosa. No lesions or ulcerations noted.  The tongue was mobile and midline, and the dentition were in good condition.    Throat - The walls of the oropharynx were smooth, pink, moist, symmetric, and had no lesions or ulcerations.  The tonsillar pillars and soft palate were symmetric.  The uvula was midline on elevation.  Neck - Normal midline excursion of the laryngotracheal complex during swallowing.  Full range of motion on passive movement.  Palpation of the occipital, submental, submandibular, internal jugular chain, and supraclavicular nodes did not demonstrate any abnormal lymph nodes or masses.  The carotid pulse was palpable bilaterally.  Palpation of the thyroid was soft and smooth, with no nodules or goiter appreciated.  The trachea was mobile and midline.  Nose - External contour is symmetric, no gross deflection or  scars.  Nasal mucosa is pink and moist with no abnormal mucus.  The septum was midline and non-obstructive, turbinates of normal size and position.  No polyps, masses, or purulence noted on examination.    Stratford-Hallpike maneuver was performed today.  She demonstrated subjective delayed vertigo when head was turned to the right.  She did not have discernible geotropic rotational nystagmus.  She had no response when the head was turned to the left.        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  I have suggested that she undergo treatment with physical therapy for the Epley maneuver.  She should return in 3-4 weeks to assess her progress.  Regarding her sinus concerns, I have ordered a CT scan of her sinuses to review on her return visit.  I advised her to continue the Flonase but discontinue Afrin at this point.      Dr. Coretta Wharton MD  Otolaryngology  [facility]

## 2018-01-16 NOTE — MR AVS SNAPSHOT
"              After Visit Summary   1/16/2018    Josette Foster    MRN: [id number]           Patient Information     Date Of Birth          1970        Visit Information        Provider Department      1/16/2018 10:45 AM Coretta Wharton MD [facility]        Today's Diagnoses     Chronic maxillary sinusitis    -  1    Benign paroxysmal positional vertigo of right ear           Follow-ups after your visit        Additional Services     PHYSICAL THERAPY REFERRAL       *This therapy referral will be filtered to a centralized scheduling office at [facility] and the patient will receive a call to schedule an appointment at a [facility] location most convenient for them. *     [facility] provides Physical Therapy evaluation and treatment and many specialty services across the [facility] system.  If requesting a specialty program, please choose from the list below.    If you have not heard from the scheduling office within 2 business days, please call [phone] for all locations, with the exception of [place], please call [phone].  Treatment: Evaluation & Treatment  Special Instructions/Modalities: Epley maneuver  Special Programs: Balance/Vestibular    Please be aware that coverage of these services is subject to the terms and limitations of your health insurance plan.  Call member services at your health plan with any benefit or coverage questions.      **Note to Provider:  If you are referring outside of [facility] for the therapy appointment, please list the name of the location in the \"special instructions\" above, print the referral and give to the patient to schedule the appointment.                  Future tests that were ordered for you today     Open Future Orders        Priority Expected Expires Ordered    CT Maxillofacial w/o Contrast Routine  1/16/2019 1/16/2018            Who to contact     If you have questions or need follow up information about " "today's clinic visit or your schedule please contact [facility] directly at [phone].  Normal or non-critical lab and imaging results will be communicated to you by MyChart, letter or phone within 4 business days after the clinic has received the results. If you do not hear from us within 7 days, please contact the clinic through [employer] or phone. If you have a critical or abnormal lab result, we will notify you by phone as soon as possible.  Submit refill requests through [employer] or call your pharmacy and they will forward the refill request to us. Please allow 3 business days for your refill to be completed.          Additional Information About Your Visit        [employer] Information     [employer] gives you secure access to your electronic health record. If you see a primary care provider, you can also send messages to your care team and make appointments. If you have questions, please call your primary care clinic.  If you do not have a primary care provider, please call [phone] and they will assist you.        Care EveryWhere ID     This is your Care EveryWhere ID. This could be used by other organizations to access your [facility] medical records  [id number]        Your Vitals Were     Pulse Temperature Height BMI (Body Mass Index)          84 98.1  F (36.7  C) (Oral) 1.727 m (5' 8\") 39.99 kg/m2         Blood Pressure from Last 3 Encounters:   01/16/18 127/87   12/24/17 (!) 137/92   08/14/17 133/82    Weight from Last 3 Encounters:   01/16/18 119.3 kg (263 lb)   12/24/17 119.3 kg (263 lb)   08/14/17 120.1 kg (264 lb 12.8 oz)              We Performed the Following     PHYSICAL THERAPY REFERRAL        Primary Care Provider Office Phone # Fax #    [facility] [phone] [address] [facility] [address]        Equal Access to Services     RICCO VELASCO AH: Keren salcidoo Soomaali, waaxda luqadaha, qaybta kaalmada adeameliayamontana, caio perez " la'aan ah. So [facility] [phone].    ATENCIÓN: Si habla nancy, tiene a barnett disposición servicios gratuitos de asistencia lingüística. Declan segura [phone].    We comply with applicable federal civil rights laws and [place] laws. We do not discriminate on the basis of race, color, national origin, age, disability, sex, sexual orientation, or gender identity.            Thank you!     Thank you for choosing [facility]  for your care. Our goal is always to provide you with excellent care. Hearing back from our patients is one way we can continue to improve our services. Please take a few minutes to complete the written survey that you may receive in the mail after your visit with us. Thank you!             Your Updated Medication List - Protect others around you: Learn how to safely use, store and throw away your medicines at www.disposemymeds.org.          This list is accurate as of: 1/16/18 11:11 AM.  Always use your most recent med list.                   Brand Name Dispense Instructions for use Diagnosis    * atorvastatin 20 MG tablet    LIPITOR          * atorvastatin 20 MG tablet    LIPITOR    90 tablet    TAKE 1 TABLET(20 MG) BY MOUTH DAILY    Hyperlipidemia LDL goal <130       fluticasone 50 MCG/ACT spray    FLONASE    1 Bottle    Spray 1 spray into both nostrils daily    Post-nasal drip       levothyroxine 75 MCG tablet    SYNTHROID/LEVOTHROID    90 tablet    TAKE 1 TABLET(75 MCG) BY MOUTH DAILY    Hypothyroidism, unspecified type       nabumetone 750 MG tablet    RELAFEN     Take 750 mg by mouth 2 times daily PRN        order for DME     1 Device    Equipment being ordered: left wrist/hand splint, velcro    Contusion of multiple sites of left hand and wrist, initial encounter       propranolol 20 MG tablet    INDERAL    60 tablet    Take 1 tablet (20 mg) by mouth 2 times daily    Migraine with aura and without status migrainosus, not intractable       tiZANidine 4 MG tablet    ZANAFLEX    30  tablet    Take 1-2 tablets (4-8 mg) by mouth 3 times daily as needed for muscle spasms    Muscle spasms of neck       * Notice:  This list has 2 medication(s) that are the same as other medications prescribed for you. Read the directions carefully, and ask your doctor or other care provider to review them with you.

## 2018-01-29 DIAGNOSIS — R09.82 POST-NASAL DRIP: ICD-10-CM

## 2018-01-29 NOTE — TELEPHONE ENCOUNTER
"FLUTICASONE 50MCG NASAL SP (120) RX        Last Written Prescription Date:  12/9/16  Last Fill Quantity: 1,   # refills: 1  Last Office Visit: 8/14/17  Future Office visit:       Requested Prescriptions   Pending Prescriptions Disp Refills     fluticasone (FLONASE) 50 MCG/ACT spray [Pharmacy Med Name: FLUTICASONE 50MCG NASAL SP (120) RX] 16 mL 0     Sig: SHAKE LIQUID AND USE 1 SPRAY IN EACH NOSTRIL DAILY    Inhaled Steroids Protocol Passed    1/29/2018  6:51 AM       Passed - Patient is age 12 or older       Passed - Recent or future visit with authorizing provider's specialty    Patient had office visit in the last year or has a visit in the next 30 days with authorizing provider.  See \"Patient Info\" tab in inbasket, or \"Choose Columns\" in Meds & Orders section of the refill encounter.               "

## 2018-01-31 RX ORDER — FLUTICASONE PROPIONATE 50 MCG
SPRAY, SUSPENSION (ML) NASAL
Qty: 16 ML | Refills: 8 | Status: SHIPPED | OUTPATIENT
Start: 2018-01-31

## 2018-02-14 DIAGNOSIS — E03.9 HYPOTHYROIDISM, UNSPECIFIED TYPE: ICD-10-CM

## 2018-02-14 RX ORDER — LEVOTHYROXINE SODIUM 75 UG/1
TABLET ORAL
Qty: 90 TABLET | Refills: 0 | Status: SHIPPED | OUTPATIENT
Start: 2018-02-14 | End: 2018-05-22

## 2018-02-14 NOTE — TELEPHONE ENCOUNTER
"LEVOTHYROXINE 0.075MG (75MCG) TABS        Last Written Prescription Date:  8/8/17  Last Fill Quantity: 90,   # refills: 2  Last Office Visit: 8/14/17  Future Office visit:       Requested Prescriptions   Pending Prescriptions Disp Refills     levothyroxine (SYNTHROID/LEVOTHROID) 75 MCG tablet [Pharmacy Med Name: LEVOTHYROXINE 0.075MG (75MCG) TABS] 90 tablet 0     Sig: TAKE 1 TABLET(75 MCG) BY MOUTH DAILY    Thyroid Protocol Passed    2/14/2018  3:26 AM       Passed - Patient is 12 years or older       Passed - Recent or future visit with authorizing provider's specialty    Patient had office visit in the last year or has a visit in the next 30 days with authorizing provider.  See \"Patient Info\" tab in inbasket, or \"Choose Columns\" in Meds & Orders section of the refill encounter.            Passed - Normal TSH on file in past 12 months    Recent Labs   Lab Test  03/22/17   1126   TSH  2.92             Passed - No active pregnancy on record    If patient is pregnant or has had a positive pregnancy test, please check TSH.         Passed - No positive pregnancy test in past 12 months    If patient is pregnant or has had a positive pregnancy test, please check TSH.            "

## 2018-05-22 DIAGNOSIS — E03.9 HYPOTHYROIDISM, UNSPECIFIED TYPE: ICD-10-CM

## 2018-05-22 RX ORDER — LEVOTHYROXINE SODIUM 75 UG/1
TABLET ORAL
Qty: 90 TABLET | Refills: 0 | Status: SHIPPED | OUTPATIENT
Start: 2018-05-22 | End: 2018-09-06

## 2018-05-22 NOTE — TELEPHONE ENCOUNTER
"LEVOTHYROXINE 0.075MG (75MCG) TABS        Last Written Prescription Date:  8/8/17  Last Fill Quantity: 90,   # refills: 2  Last Office Visit: 8/14/17  Future Office visit:       Requested Prescriptions   Pending Prescriptions Disp Refills     levothyroxine (SYNTHROID/LEVOTHROID) 75 MCG tablet [Pharmacy Med Name: LEVOTHYROXINE 0.075MG (75MCG) TABS] 90 tablet 0     Sig: TAKE 1 TABLET(75 MCG) BY MOUTH DAILY    Thyroid Protocol Failed    5/22/2018  3:26 AM       Failed - Normal TSH on file in past 12 months    Recent Labs   Lab Test  03/22/17   1126   TSH  2.92             Passed - Patient is 12 years or older       Passed - Recent (12 mo) or future (30 days) visit within the authorizing provider's specialty    Patient had office visit in the last 12 months or has a visit in the next 30 days with authorizing provider or within the authorizing provider's specialty.  See \"Patient Info\" tab in inbasket, or \"Choose Columns\" in Meds & Orders section of the refill encounter.           Passed - No active pregnancy on record    If patient is pregnant or has had a positive pregnancy test, please check TSH.         Passed - No positive pregnancy test in past 12 months    If patient is pregnant or has had a positive pregnancy test, please check TSH.            "

## 2018-05-22 NOTE — TELEPHONE ENCOUNTER
Medication is being filled for 1 time refill only due to:  Patient needs labs thyroid. Future labs ordered yes.   Aniceto Miller RN

## 2018-06-11 DIAGNOSIS — E78.5 HYPERLIPIDEMIA LDL GOAL <130: ICD-10-CM

## 2018-06-11 DIAGNOSIS — E03.9 HYPOTHYROIDISM, UNSPECIFIED TYPE: ICD-10-CM

## 2018-06-11 LAB
CHOLEST SERPL-MCNC: 241 MG/DL
HDLC SERPL-MCNC: 46 MG/DL
LDLC SERPL CALC-MCNC: 168 MG/DL
NONHDLC SERPL-MCNC: 195 MG/DL
TRIGL SERPL-MCNC: 134 MG/DL
TSH SERPL DL<=0.005 MIU/L-ACNC: 3.02 MU/L (ref 0.4–4)

## 2018-06-11 PROCEDURE — 84443 ASSAY THYROID STIM HORMONE: CPT | Performed by: PHYSICIAN ASSISTANT

## 2018-06-11 PROCEDURE — 80061 LIPID PANEL: CPT | Performed by: PHYSICIAN ASSISTANT

## 2018-06-11 PROCEDURE — 36415 COLL VENOUS BLD VENIPUNCTURE: CPT | Performed by: PHYSICIAN ASSISTANT

## 2018-09-06 DIAGNOSIS — E03.9 HYPOTHYROIDISM, UNSPECIFIED TYPE: ICD-10-CM

## 2018-09-06 RX ORDER — LEVOTHYROXINE SODIUM 75 UG/1
75 TABLET ORAL DAILY
Qty: 90 TABLET | Refills: 0 | Status: SHIPPED | OUTPATIENT
Start: 2018-09-06

## 2018-09-06 NOTE — TELEPHONE ENCOUNTER
Routing refill request to provider for review/approval because:  Last Office Visit 8/14/17, had TSH lab drawn 6/11/18    Jovita MENDEZ RN

## 2018-09-06 NOTE — TELEPHONE ENCOUNTER
"LEVOTHYROXINE 0.075MG (75MCG) TABS        Last Written Prescription Date:  5/22/18  Last Fill Quantity: 90,   # refills: 0  Last Office Visit: 8/14/17  Future Office visit:       Requested Prescriptions   Pending Prescriptions Disp Refills     levothyroxine (SYNTHROID/LEVOTHROID) 75 MCG tablet [Pharmacy Med Name: LEVOTHYROXINE 0.075MG (75MCG) TABS] 90 tablet 0     Sig: TAKE 1 TABLET BY MOUTH DAILY.    Thyroid Protocol Failed    9/6/2018 10:48 AM       Failed - Recent (12 mo) or future (30 days) visit within the authorizing provider's specialty    Patient had office visit in the last 12 months or has a visit in the next 30 days with authorizing provider or within the authorizing provider's specialty.  See \"Patient Info\" tab in inbasket, or \"Choose Columns\" in Meds & Orders section of the refill encounter.           Passed - Patient is 12 years or older       Passed - Normal TSH on file in past 12 months    Recent Labs   Lab Test  06/11/18   1042   TSH  3.02             Passed - No active pregnancy on record    If patient is pregnant or has had a positive pregnancy test, please check TSH.         Passed - No positive pregnancy test in past 12 months    If patient is pregnant or has had a positive pregnancy test, please check TSH.            "

## 2018-09-07 NOTE — TELEPHONE ENCOUNTER
My charted patient that needs to be seen for future refills, sig also has this reminder, will keep open to ensure the patient reads.    MICHAEL Silverio

## 2018-09-10 NOTE — TELEPHONE ENCOUNTER
Tried calling patient and the vm said that the mailbox is full. Please try your call again later.  Aniceto Miller RN

## 2018-09-11 NOTE — TELEPHONE ENCOUNTER
Message left on vm advising her to schedule an appointment before further refills  Aniceto Miller RN

## 2019-09-28 ENCOUNTER — HEALTH MAINTENANCE LETTER (OUTPATIENT)
Age: 49
End: 2019-09-28

## 2021-01-09 ENCOUNTER — HEALTH MAINTENANCE LETTER (OUTPATIENT)
Age: 51
End: 2021-01-09

## 2021-03-13 ENCOUNTER — HEALTH MAINTENANCE LETTER (OUTPATIENT)
Age: 51
End: 2021-03-13

## 2021-10-23 ENCOUNTER — HEALTH MAINTENANCE LETTER (OUTPATIENT)
Age: 51
End: 2021-10-23

## 2022-02-12 ENCOUNTER — HEALTH MAINTENANCE LETTER (OUTPATIENT)
Age: 52
End: 2022-02-12

## 2022-04-09 ENCOUNTER — HEALTH MAINTENANCE LETTER (OUTPATIENT)
Age: 52
End: 2022-04-09

## 2022-10-09 ENCOUNTER — HEALTH MAINTENANCE LETTER (OUTPATIENT)
Age: 52
End: 2022-10-09

## 2023-02-18 ENCOUNTER — HEALTH MAINTENANCE LETTER (OUTPATIENT)
Age: 53
End: 2023-02-18

## 2023-05-21 ENCOUNTER — HEALTH MAINTENANCE LETTER (OUTPATIENT)
Age: 53
End: 2023-05-21

## 2024-03-16 ENCOUNTER — HEALTH MAINTENANCE LETTER (OUTPATIENT)
Age: 54
End: 2024-03-16